# Patient Record
Sex: FEMALE | Race: WHITE | NOT HISPANIC OR LATINO | Employment: OTHER | RURAL
[De-identification: names, ages, dates, MRNs, and addresses within clinical notes are randomized per-mention and may not be internally consistent; named-entity substitution may affect disease eponyms.]

---

## 2019-06-28 ENCOUNTER — OFFICE VISIT (OUTPATIENT)
Dept: FAMILY MEDICINE CLINIC | Facility: CLINIC | Age: 69
End: 2019-06-28

## 2019-06-28 VITALS
TEMPERATURE: 97.2 F | SYSTOLIC BLOOD PRESSURE: 124 MMHG | OXYGEN SATURATION: 91 % | WEIGHT: 134.8 LBS | HEART RATE: 58 BPM | BODY MASS INDEX: 23.01 KG/M2 | DIASTOLIC BLOOD PRESSURE: 76 MMHG | HEIGHT: 64 IN

## 2019-06-28 DIAGNOSIS — E78.00 HYPERCHOLESTEROLEMIA: Primary | ICD-10-CM

## 2019-06-28 DIAGNOSIS — M19.90 ARTHRITIS: ICD-10-CM

## 2019-06-28 DIAGNOSIS — F39 MOOD DISORDER (HCC): ICD-10-CM

## 2019-06-28 DIAGNOSIS — I10 ESSENTIAL HYPERTENSION: ICD-10-CM

## 2019-06-28 DIAGNOSIS — R63.4 WEIGHT LOSS: ICD-10-CM

## 2019-06-28 DIAGNOSIS — D64.9 ANEMIA, UNSPECIFIED TYPE: ICD-10-CM

## 2019-06-28 PROBLEM — G47.00 INSOMNIA: Status: ACTIVE | Noted: 2019-06-28

## 2019-06-28 PROBLEM — F41.9 ANXIETY: Status: ACTIVE | Noted: 2019-06-28

## 2019-06-28 PROBLEM — R53.83 FATIGUE: Status: ACTIVE | Noted: 2019-06-28

## 2019-06-28 PROBLEM — E53.8 COBALAMIN DEFICIENCY: Status: ACTIVE | Noted: 2019-06-28

## 2019-06-28 PROBLEM — Z72.0 TOBACCO USER: Status: ACTIVE | Noted: 2019-06-28

## 2019-06-28 PROBLEM — R43.2 TASTE SENSE ALTERED: Status: ACTIVE | Noted: 2019-06-28

## 2019-06-28 PROCEDURE — 99203 OFFICE O/P NEW LOW 30 MIN: CPT | Performed by: FAMILY MEDICINE

## 2019-06-28 RX ORDER — LAMOTRIGINE 150 MG/1
150 TABLET ORAL DAILY
Qty: 90 TABLET | Refills: 3 | Status: SHIPPED | OUTPATIENT
Start: 2019-06-28 | End: 2019-10-02 | Stop reason: SDUPTHER

## 2019-06-28 RX ORDER — QUETIAPINE FUMARATE 50 MG/1
1 TABLET, FILM COATED ORAL
COMMUNITY
End: 2019-06-28 | Stop reason: SDUPTHER

## 2019-06-28 RX ORDER — PANTOPRAZOLE SODIUM 40 MG/1
1 TABLET, DELAYED RELEASE ORAL EVERY MORNING
COMMUNITY
End: 2020-10-16 | Stop reason: SDUPTHER

## 2019-06-28 RX ORDER — ATORVASTATIN CALCIUM 10 MG/1
10 TABLET, FILM COATED ORAL DAILY
Qty: 90 TABLET | Refills: 3 | Status: SHIPPED | OUTPATIENT
Start: 2019-06-28 | End: 2020-10-16 | Stop reason: SDUPTHER

## 2019-06-28 RX ORDER — QUETIAPINE FUMARATE 50 MG/1
50 TABLET, FILM COATED ORAL
Qty: 90 TABLET | Refills: 3 | Status: SHIPPED | OUTPATIENT
Start: 2019-06-28 | End: 2019-10-02 | Stop reason: SDUPTHER

## 2019-06-28 RX ORDER — MELOXICAM 7.5 MG/1
7.5 TABLET ORAL DAILY
Qty: 90 TABLET | Refills: 3 | Status: SHIPPED | OUTPATIENT
Start: 2019-06-28 | End: 2019-10-02 | Stop reason: SDUPTHER

## 2019-06-28 RX ORDER — LISINOPRIL 10 MG/1
1 TABLET ORAL DAILY
COMMUNITY
End: 2019-06-28

## 2019-06-28 RX ORDER — SERTRALINE HYDROCHLORIDE 100 MG/1
100 TABLET, FILM COATED ORAL
Qty: 90 TABLET | Refills: 3 | Status: SHIPPED | OUTPATIENT
Start: 2019-06-28 | End: 2019-10-02 | Stop reason: SDUPTHER

## 2019-06-28 RX ORDER — MELOXICAM 7.5 MG/1
1 TABLET ORAL DAILY
COMMUNITY
End: 2019-06-28 | Stop reason: SDUPTHER

## 2019-06-28 RX ORDER — SERTRALINE HYDROCHLORIDE 100 MG/1
1 TABLET, FILM COATED ORAL
COMMUNITY
End: 2019-06-28 | Stop reason: SDUPTHER

## 2019-06-28 RX ORDER — ATORVASTATIN CALCIUM 10 MG/1
1 TABLET, FILM COATED ORAL DAILY
COMMUNITY
End: 2019-06-28 | Stop reason: SDUPTHER

## 2019-06-28 RX ORDER — LAMOTRIGINE 150 MG/1
1 TABLET ORAL DAILY
COMMUNITY
End: 2019-06-28 | Stop reason: SDUPTHER

## 2019-06-28 NOTE — PROGRESS NOTES
OFFICE VISIT NOTE:    Isela Mejía is a 68 y.o. female who presents today for Hypertension and Establish Care.     No meds for a couple of weeks, and her BP is good today.       Hypertension   This is a chronic problem. The current episode started more than 1 year ago. The problem is unchanged. The problem is controlled. Pertinent negatives include no chest pain, headaches, orthopnea, palpitations, peripheral edema, PND or shortness of breath. There are no associated agents to hypertension. Risk factors for coronary artery disease include family history, post-menopausal state, stress, sedentary lifestyle and smoking/tobacco exposure. Past treatments include ACE inhibitors. Current antihypertension treatment includes ACE inhibitors. The current treatment provides significant improvement. There are no compliance problems.         Past medical/surgical history, Family history, Social history, Allergies and Medications have been reviewed with the patient today and are updated in [facility] EMR. See below.    Past Medical History:   Diagnosis Date   • Anemia    • Anxiety    • Arthritis    • Insomnia      Past Surgical History:   Procedure Laterality Date   • COLONOSCOPY     • REPLACEMENT TOTAL KNEE Right    • TUBAL ABDOMINAL LIGATION       Family History   Problem Relation Age of Onset   • Cancer Mother    • Cancer Father      Social History     Tobacco Use   • Smoking status: Current Every Day Smoker     Packs/day: 0.50     Years: 30.00     Pack years: 15.00     Types: Cigarettes   • Smokeless tobacco: Never Used   Substance Use Topics   • Alcohol use: No     Frequency: Never     Binge frequency: Never   • Drug use: No       Allergies:  Patient has no known allergies.    Current Meds:    Current Outpatient Medications:   •  atorvastatin (LIPITOR) 10 MG tablet, Take 1 tablet by mouth Daily., Disp: 90 tablet, Rfl: 3  •  lamoTRIgine (LAMICTAL) 150 MG tablet, Take 1 tablet by mouth Daily., Disp: 90 tablet, Rfl: 3  •  meloxicam  "(MOBIC) 7.5 MG tablet, Take 1 tablet by mouth Daily., Disp: 90 tablet, Rfl: 3  •  pantoprazole (PROTONIX) 40 MG EC tablet, Take 1 tablet by mouth Every Morning., Disp: , Rfl:   •  QUEtiapine (SEROQUEL) 50 MG tablet, Take 1 tablet by mouth every night at bedtime., Disp: 90 tablet, Rfl: 3  •  sertraline (ZOLOFT) 100 MG tablet, Take 1 tablet by mouth every night at bedtime., Disp: 90 tablet, Rfl: 3    Review of Systems:  Review of Systems   Constitutional: Negative for activity change, fatigue, fever, unexpected weight gain and unexpected weight loss.   Respiratory: Negative for shortness of breath.    Cardiovascular: Negative for chest pain, palpitations, orthopnea and PND.   Gastrointestinal: Negative for abdominal pain.   Genitourinary: Negative for difficulty urinating.   Skin: Negative for rash.   Neurological: Negative for syncope and headache.       Physical Examination:  Vital Signs:  /76 (BP Location: Left arm, Patient Position: Sitting, Cuff Size: Adult)   Pulse 58   Temp 97.2 °F (36.2 °C) (Tympanic)   Ht 162.6 cm (64\")   Wt 61.1 kg (134 lb 12.8 oz)   LMP  (LMP Unknown)   SpO2 91%   Breastfeeding? No   BMI 23.14 kg/m²   Physical Exam   Constitutional: She is oriented to person, place, and time. She appears well-developed and well-nourished. No distress.   HENT:   Head: Normocephalic and atraumatic.   Mouth/Throat: Oropharynx is clear and moist.   Neck: Normal range of motion. Neck supple. No JVD present.   Cardiovascular: Normal rate, regular rhythm, normal heart sounds and intact distal pulses.   Pulmonary/Chest: Effort normal and breath sounds normal. No respiratory distress.   Musculoskeletal: Normal range of motion. She exhibits edema (BLE CVI mild).   Neurological: She is alert and oriented to person, place, and time. No cranial nerve deficit.   Skin: Skin is warm and dry. Capillary refill takes less than 2 seconds. No rash noted.   Psychiatric: She has a normal mood and affect. Her " behavior is normal.   Nursing note and vitals reviewed.      Procedures    ASSESSMENT/ PLAN:        Problem List Items Addressed This Visit        Cardiovascular and Mediastinum    Hypercholesterolemia - Primary    Relevant Medications    atorvastatin (LIPITOR) 10 MG tablet    Other Relevant Orders    Comprehensive metabolic panel    Lipid panel    Hypertensive disorder    Relevant Orders    Comprehensive metabolic panel    Lipid panel    CBC and Differential    T4    TSH       Musculoskeletal and Integument    Arthritis    Relevant Medications    meloxicam (MOBIC) 7.5 MG tablet       Hematopoietic and Hemostatic    Anemia       Other    Mood disorder (CMS/HCC)    Relevant Medications    lamoTRIgine (LAMICTAL) 150 MG tablet    QUEtiapine (SEROQUEL) 50 MG tablet    sertraline (ZOLOFT) 100 MG tablet      Other Visit Diagnoses     Weight loss        Relevant Orders    T4    TSH                   Specific Patient Instructions:  MEDICATION Instructions: Encouraged patient to continue routine medicines as prescribed and maintain compliance. Patient instructed to report any adverse side effects or reactions to medicines promptly to the office. Patient instructed to make us aware of any OTC or herbal meds or supplement use.  DIET Recommendations: No new recommendations regarding diet/restrictions.  EXERCISE Instructions: No new recommendations.    SMOKING Recommendations: N/A  HEALTH MAINTENANCE:  N/A  MISCELLANEOUS Instructions: N/A      Medications ordered or changed this visit:  New Medications Ordered This Visit   Medications   • atorvastatin (LIPITOR) 10 MG tablet     Sig: Take 1 tablet by mouth Daily.     Dispense:  90 tablet     Refill:  3   • lamoTRIgine (LAMICTAL) 150 MG tablet     Sig: Take 1 tablet by mouth Daily.     Dispense:  90 tablet     Refill:  3   • meloxicam (MOBIC) 7.5 MG tablet     Sig: Take 1 tablet by mouth Daily.     Dispense:  90 tablet     Refill:  3   • QUEtiapine (SEROQUEL) 50 MG tablet     Sig:  Take 1 tablet by mouth every night at bedtime.     Dispense:  90 tablet     Refill:  3   • sertraline (ZOLOFT) 100 MG tablet     Sig: Take 1 tablet by mouth every night at bedtime.     Dispense:  90 tablet     Refill:  3        FOLLOW-UP:  Return in about 3 months (around 9/28/2019) for Recheck.    I discussed the patients findings and my recommendations with patient.  An After Visit Summary (AVS) was printed and given to the patient at discharge.      Henok Torres MD, FAAFP  6/30/2019

## 2019-06-28 NOTE — PATIENT INSTRUCTIONS
Suspect Essential HTN.Good BP control is encouraged with Goal BP based on JNC 8 guidelines 2014 <140/90 for patients with known cardiac disease and diabetes. (SOL. 2014:322 (5):507-520. doi:10.1001/sol.2013.39763): general population <60 yr old goal BP <140/90 and for those >60 <150/90.  For patients of all ages with Diabetes, CKD, Known CAD <140/90. Recommended to the patient to obtain electronic home BP machine with upper arm blood pressure cuff and to check regularly as instructed.  Keep BP log and bring to subsequent visits. Stable, at goal.  a. LABS: routine for hypertension recommended and ordered if necessary.  b. Recommend if you do not have a home BP machine to obtain an electronic machine with arm blood pressure cuff.      c. Monitor BP over the next week and keep log to bring back to office. Discussed medication therapy however pt wants to try to control with diet exercise. .  Your provider  has recommended self-monitoring of your blood pressure.  If you do not have a blood pressure cuff you may purchase one from the local pharmacy.  You may ask the pharmacist which brand and model they recommend.  Obtain your blood pressure measurement at least 2x per week.  You should also check your blood pressure if you experience any symptoms of blurred visit, dizziness or headache.  Please record all blood pressure measurements and bring them to next office visit.  If you have any questions about the accuracy of your blood pressure machine please bring it in to the office and our staff will be happy to check accuracy.   d. Encouraged to eat a low sodium heart healthy diet  e. Offered handout on HTN educational topics.  These were provided if patient requested these today.  f. MEDS: as listed in today's visit.  g. Risks/benefits of current and new medications discussed with the patient and or family today.  The patient/family are aware and accept that if there any side effects they should call or return to clinic  as soon as possible.  Appropriate F/U discussed for topics addressed today. All questions were answered to the  satisfactory state of patient/family.  Should symptoms fail to improve or worsen they agree to call or return to clinic or to go to the ER. Education handouts were offered on any new Rx if requested.  Discussed the importance of following up with any needed screening tests/labs/specialist appointments and any requested follow-up recommended by me today.  Importance of maintaining follow-up discussed and patient accepts that missed appointments can delay diagnosis and potentially lead to worsening of conditions.      Cholesterol  Cholesterol is a white, waxy, fat-like substance that is needed by the human body in small amounts. The liver makes all the cholesterol we need. Cholesterol is carried from the liver by the blood through the blood vessels. Deposits of cholesterol (plaques) may build up on blood vessel (artery) walls. Plaques make the arteries narrower and stiffer. Cholesterol plaques increase the risk for heart attack and stroke.  You cannot feel your cholesterol level even if it is very high. The only way to know that it is high is to have a blood test. Once you know your cholesterol levels, you should keep a record of the test results. Work with your health care provider to keep your levels in the desired range.  What do the results mean?  · Total cholesterol is a rough measure of all the cholesterol in your blood.  · LDL (low-density lipoprotein) is the “bad” cholesterol. This is the type that causes plaque to build up on the artery walls. You want this level to be low.  · HDL (high-density lipoprotein) is the “good” cholesterol because it cleans the arteries and carries the LDL away. You want this level to be high.  · Triglycerides are fat that the body can either burn for energy or store. High levels are closely linked to heart disease.  What are the desired levels of cholesterol?  · Total  cholesterol below 200.  · LDL below 100 for people who are at risk, below 70 for people at very high risk.  · HDL above 40 is good. A level of 60 or higher is considered to be protective against heart disease.  · Triglycerides below 150.  How can I lower my cholesterol?  Diet  Follow your diet program as told by your health care provider.  · Choose fish or white meat chicken and turkey, roasted or baked. Limit fatty cuts of red meat, fried foods, and processed meats, such as sausage and lunch meats.  · Eat lots of fresh fruits and vegetables.  · Choose whole grains, beans, pasta, potatoes, and cereals.  · Choose olive oil, corn oil, or canola oil, and use only small amounts.  · Avoid butter, mayonnaise, shortening, or palm kernel oils.  · Avoid foods with trans fats.  · Drink skim or nonfat milk and eat low-fat or nonfat yogurt and cheeses. Avoid whole milk, cream, ice cream, egg yolks, and full-fat cheeses.  · Healthier desserts include ramona food cake, debbi snaps, animal crackers, hard candy, popsicles, and low-fat or nonfat frozen yogurt. Avoid pastries, cakes, pies, and cookies.    Exercise  · Follow your exercise program as told by your health care provider. A regular program:  ? Helps to decrease LDL and raise HDL.  ? Helps with weight control.  · Do things that increase your activity level, such as gardening, walking, and taking the stairs.  · Ask your health care provider about ways that you can be more active in your daily life.    Medicine  · Take over-the-counter and prescription medicines only as told by your health care provider.  ? Medicine may be prescribed by your health care provider to help lower cholesterol and decrease the risk for heart disease. This is usually done if diet and exercise have failed to bring down cholesterol levels.  ? If you have several risk factors, you may need medicine even if your levels are normal.    This information is not intended to replace advice given to you by  your health care provider. Make sure you discuss any questions you have with your health care provider.  Document Released: 09/12/2002 Document Revised: 07/15/2017 Document Reviewed: 06/17/2017  [employer] Interactive Patient Education © 2019 [employer]      Living With Depression  Everyone experiences occasional disappointment, sadness, and loss in their lives. When you are feeling down, blue, or sad for at least 2 weeks in a row, it may mean that you have depression. Depression can affect your thoughts and feelings, relationships, daily activities, and physical health. It is caused by changes in the way your brain functions. If you receive a diagnosis of depression, your health care provider will tell you which type of depression you have and what treatment options are available to you.  If you are living with depression, there are ways to help you recover from it and also ways to prevent it from coming back.  How to cope with lifestyle changes  Coping with stress  Stress is your body’s reaction to life changes and events, both good and bad. Stressful situations may include:  · Getting .  · The death of a spouse.  · Losing a job.  · Retiring.  · Having a baby.    Stress can last just a few hours or it can be ongoing. Stress can play a major role in depression, so it is important to learn both how to cope with stress and how to think about it differently.  Talk with your health care provider or a counselor if you would like to learn more about stress reduction. He or she may suggest some stress reduction techniques, such as:  · Music therapy. This can include creating music or listening to music. Choose music that you enjoy and that inspires you.  · Mindfulness-based meditation. This kind of meditation can be done while sitting or walking. It involves being aware of your normal breaths, rather than trying to control your breathing.  · Centering prayer. This is a kind of meditation that involves focusing on  a spiritual word or phrase. Choose a word, phrase, or sacred image that is meaningful to you and that brings you peace.  · Deep breathing. To do this, expand your stomach and inhale slowly through your nose. Hold your breath for 3-5 seconds, then exhale slowly, allowing your stomach muscles to relax.  · Muscle relaxation. This involves intentionally tensing muscles then relaxing them.    Choose a stress reduction technique that fits your lifestyle and personality. Stress reduction techniques take time and practice to develop. Set aside 5-15 minutes a day to do them. Therapists can offer training in these techniques. The training may be covered by some insurance plans. Other things you can do to manage stress include:  · Keeping a stress diary. This can help you learn what triggers your stress and ways to control your response.  · Understanding what your limits are and saying no to requests or events that lead to a schedule that is too full.  · Thinking about how you respond to certain situations. You may not be able to control everything, but you can control how you react.  · Adding humor to your life by watching funny films or TV shows.  · Making time for activities that help you relax and not feeling guilty about spending your time this way.    Medicines  Your health care provider may suggest certain medicines if he or she feels that they will help improve your condition. Avoid using alcohol and other substances that may prevent your medicines from working properly (may interact). It is also important to:  · Talk with your pharmacist or health care provider about all the medicines that you take, their possible side effects, and what medicines are safe to take together.  · Make it your goal to take part in all treatment decisions (shared decision-making). This includes giving input on the side effects of medicines. It is best if shared decision-making with your health care provider is part of your total treatment  plan.    If your health care provider prescribes a medicine, you may not notice the full benefits of it for 4-8 weeks. Most people who are treated for depression need to be on medicine for at least 6-12 months after they feel better. If you are taking medicines as part of your treatment, do not stop taking medicines without first talking to your health care provider. You may need to have the medicine slowly decreased (tapered) over time to decrease the risk of harmful side effects.  Relationships  Your health care provider may suggest family therapy along with individual therapy and drug therapy. While there may not be family problems that are causing you to feel depressed, it is still important to make sure your family learns as much as they can about your mental health. Having your family’s support can help make your treatment successful.  How to recognize changes in your condition  Everyone has a different response to treatment for depression. Recovery from major depression happens when you have not had signs of major depression for two months. This may mean that you will start to:  · Have more interest in doing activities.  · Feel less hopeless than you did 2 months ago.  · Have more energy.  · Overeat less often, or have better or improving appetite.  · Have better concentration.    Your health care provider will work with you to decide the next steps in your recovery. It is also important to recognize when your condition is getting worse. Watch for these signs:  · Having fatigue or low energy.  · Eating too much or too little.  · Sleeping too much or too little.  · Feeling restless, agitated, or hopeless.  · Having trouble concentrating or making decisions.  · Having unexplained physical complaints.  · Feeling irritable, angry, or aggressive.    Get help as soon as you or your family members notice these symptoms coming back.  How to get support and help from others  How to talk with friends and family members  about your condition  Talking to friends and family members about your condition can provide you with one way to get support and guidance. Reach out to trusted friends or family members, explain your symptoms to them, and let them know that you are working with a health care provider to treat your depression.  Financial resources  Not all insurance plans cover mental health care, so it is important to check with your insurance carrier. If paying for co-pays or counseling services is a problem, search for a [facility] or [facility] mental health care center. They may be able to offer public mental health care services at low or no cost when you are not able to see a private health care provider.  If you are taking medicine for depression, you may be able to get the generic form, which may be less expensive. Some makers of prescription medicines also offer help to patients who cannot afford the medicines they need.  Follow these instructions at home:  · Get the right amount and quality of sleep.  · Cut down on using caffeine, tobacco, alcohol, and other potentially harmful substances.  · Try to exercise, such as walking or lifting small weights.  · Take over-the-counter and prescription medicines only as told by your health care provider.  · Eat a healthy diet that includes plenty of vegetables, fruits, whole grains, low-fat dairy products, and lean protein. Do not eat a lot of foods that are high in solid fats, added sugars, or salt.  · Keep all follow-up visits as told by your health care provider. This is important.  Contact a health care provider if:  · You stop taking your antidepressant medicines, and you have any of these symptoms:  ? Nausea.  ? Headache.  ? Feeling lightheaded.  ? Chills and body aches.  ? Not being able to sleep (insomnia).  · You or your friends and family think your depression is getting worse.  Get help right away if:  · You have thoughts of hurting yourself or others.  If you ever feel like you  may hurt yourself or others, or have thoughts about taking your own life, get help right away. You can go to your nearest emergency department or call:  · Your local emergency services (911 in the [place]  · A suicide crisis helpline, such as the National Suicide Prevention Lifeline at 1-173.361.6659. This is open 24-hours a day.    Summary  · If you are living with depression, there are ways to help you recover from it and also ways to prevent it from coming back.  · Work with your health care team to create a management plan that includes counseling, stress management techniques, and healthy lifestyle habits.  This information is not intended to replace advice given to you by your health care provider. Make sure you discuss any questions you have with your health care provider.  Document Released: 11/20/2017 Document Revised: 11/20/2017 Document Reviewed: 11/20/2017  [employer] Interactive Patient Education © 2019 [employer]

## 2019-10-02 ENCOUNTER — OFFICE VISIT (OUTPATIENT)
Dept: FAMILY MEDICINE CLINIC | Facility: CLINIC | Age: 69
End: 2019-10-02

## 2019-10-02 VITALS
WEIGHT: 132.5 LBS | HEART RATE: 85 BPM | OXYGEN SATURATION: 94 % | SYSTOLIC BLOOD PRESSURE: 132 MMHG | HEIGHT: 64 IN | DIASTOLIC BLOOD PRESSURE: 76 MMHG | BODY MASS INDEX: 22.62 KG/M2

## 2019-10-02 DIAGNOSIS — F39 MOOD DISORDER (HCC): ICD-10-CM

## 2019-10-02 DIAGNOSIS — Z23 NEED FOR IMMUNIZATION AGAINST INFLUENZA: Primary | ICD-10-CM

## 2019-10-02 DIAGNOSIS — M19.90 ARTHRITIS: ICD-10-CM

## 2019-10-02 PROCEDURE — 90653 IIV ADJUVANT VACCINE IM: CPT | Performed by: FAMILY MEDICINE

## 2019-10-02 PROCEDURE — G0008 ADMIN INFLUENZA VIRUS VAC: HCPCS | Performed by: FAMILY MEDICINE

## 2019-10-02 PROCEDURE — 99214 OFFICE O/P EST MOD 30 MIN: CPT | Performed by: FAMILY MEDICINE

## 2019-10-02 RX ORDER — QUETIAPINE FUMARATE 100 MG/1
100 TABLET, FILM COATED ORAL
Qty: 90 TABLET | Refills: 1 | Status: SHIPPED | OUTPATIENT
Start: 2019-10-02 | End: 2020-04-06

## 2019-10-02 RX ORDER — MELOXICAM 7.5 MG/1
7.5 TABLET ORAL 2 TIMES DAILY
Qty: 60 TABLET | Refills: 0 | Status: SHIPPED | OUTPATIENT
Start: 2019-10-02 | End: 2019-10-02 | Stop reason: SDUPTHER

## 2019-10-02 RX ORDER — MELOXICAM 7.5 MG/1
7.5 TABLET ORAL 2 TIMES DAILY
Qty: 180 TABLET | Refills: 1 | Status: SHIPPED | OUTPATIENT
Start: 2019-10-02 | End: 2019-10-10 | Stop reason: DRUGHIGH

## 2019-10-02 RX ORDER — SERTRALINE HYDROCHLORIDE 100 MG/1
100 TABLET, FILM COATED ORAL
Qty: 30 TABLET | Refills: 0 | Status: SHIPPED | OUTPATIENT
Start: 2019-10-02 | End: 2019-10-02 | Stop reason: SDUPTHER

## 2019-10-02 RX ORDER — LAMOTRIGINE 150 MG/1
150 TABLET ORAL DAILY
Qty: 30 TABLET | Refills: 0 | Status: SHIPPED | OUTPATIENT
Start: 2019-10-02 | End: 2019-10-02 | Stop reason: SDUPTHER

## 2019-10-02 RX ORDER — QUETIAPINE FUMARATE 100 MG/1
100 TABLET, FILM COATED ORAL
Qty: 30 TABLET | Refills: 0 | Status: SHIPPED | OUTPATIENT
Start: 2019-10-02 | End: 2019-10-02 | Stop reason: SDUPTHER

## 2019-10-02 RX ORDER — SERTRALINE HYDROCHLORIDE 100 MG/1
100 TABLET, FILM COATED ORAL
Qty: 90 TABLET | Refills: 1 | Status: SHIPPED | OUTPATIENT
Start: 2019-10-02 | End: 2020-06-01

## 2019-10-02 RX ORDER — LAMOTRIGINE 150 MG/1
150 TABLET ORAL DAILY
Qty: 90 TABLET | Refills: 1 | Status: SHIPPED | OUTPATIENT
Start: 2019-10-02 | End: 2020-06-01

## 2019-10-02 NOTE — PATIENT INSTRUCTIONS
Living With Depression  Everyone experiences occasional disappointment, sadness, and loss in their lives. When you are feeling down, blue, or sad for at least 2 weeks in a row, it may mean that you have depression. Depression can affect your thoughts and feelings, relationships, daily activities, and physical health. It is caused by changes in the way your brain functions. If you receive a diagnosis of depression, your health care provider will tell you which type of depression you have and what treatment options are available to you.  If you are living with depression, there are ways to help you recover from it and also ways to prevent it from coming back.  How to cope with lifestyle changes  Coping with stress  Stress is your body’s reaction to life changes and events, both good and bad. Stressful situations may include:  · Getting .  · The death of a spouse.  · Losing a job.  · Retiring.  · Having a baby.  Stress can last just a few hours or it can be ongoing. Stress can play a major role in depression, so it is important to learn both how to cope with stress and how to think about it differently.  Talk with your health care provider or a counselor if you would like to learn more about stress reduction. He or she may suggest some stress reduction techniques, such as:  · Music therapy. This can include creating music or listening to music. Choose music that you enjoy and that inspires you.  · Mindfulness-based meditation. This kind of meditation can be done while sitting or walking. It involves being aware of your normal breaths, rather than trying to control your breathing.  · Centering prayer. This is a kind of meditation that involves focusing on a spiritual word or phrase. Choose a word, phrase, or sacred image that is meaningful to you and that brings you peace.  · Deep breathing. To do this, expand your stomach and inhale slowly through your nose. Hold your breath for 3-5 seconds, then exhale slowly,  allowing your stomach muscles to relax.  · Muscle relaxation. This involves intentionally tensing muscles then relaxing them.  Choose a stress reduction technique that fits your lifestyle and personality. Stress reduction techniques take time and practice to develop. Set aside 5-15 minutes a day to do them. Therapists can offer training in these techniques. The training may be covered by some insurance plans. Other things you can do to manage stress include:  · Keeping a stress diary. This can help you learn what triggers your stress and ways to control your response.  · Understanding what your limits are and saying no to requests or events that lead to a schedule that is too full.  · Thinking about how you respond to certain situations. You may not be able to control everything, but you can control how you react.  · Adding humor to your life by watching funny films or TV shows.  · Making time for activities that help you relax and not feeling guilty about spending your time this way.    Medicines  Your health care provider may suggest certain medicines if he or she feels that they will help improve your condition. Avoid using alcohol and other substances that may prevent your medicines from working properly (may interact). It is also important to:  · Talk with your pharmacist or health care provider about all the medicines that you take, their possible side effects, and what medicines are safe to take together.  · Make it your goal to take part in all treatment decisions (shared decision-making). This includes giving input on the side effects of medicines. It is best if shared decision-making with your health care provider is part of your total treatment plan.  If your health care provider prescribes a medicine, you may not notice the full benefits of it for 4-8 weeks. Most people who are treated for depression need to be on medicine for at least 6-12 months after they feel better. If you are taking medicines as  part of your treatment, do not stop taking medicines without first talking to your health care provider. You may need to have the medicine slowly decreased (tapered) over time to decrease the risk of harmful side effects.  Relationships  Your health care provider may suggest family therapy along with individual therapy and drug therapy. While there may not be family problems that are causing you to feel depressed, it is still important to make sure your family learns as much as they can about your mental health. Having your family’s support can help make your treatment successful.  How to recognize changes in your condition  Everyone has a different response to treatment for depression. Recovery from major depression happens when you have not had signs of major depression for two months. This may mean that you will start to:  · Have more interest in doing activities.  · Feel less hopeless than you did 2 months ago.  · Have more energy.  · Overeat less often, or have better or improving appetite.  · Have better concentration.  Your health care provider will work with you to decide the next steps in your recovery. It is also important to recognize when your condition is getting worse. Watch for these signs:  · Having fatigue or low energy.  · Eating too much or too little.  · Sleeping too much or too little.  · Feeling restless, agitated, or hopeless.  · Having trouble concentrating or making decisions.  · Having unexplained physical complaints.  · Feeling irritable, angry, or aggressive.  Get help as soon as you or your family members notice these symptoms coming back.  How to get support and help from others  How to talk with friends and family members about your condition    Talking to friends and family members about your condition can provide you with one way to get support and guidance. Reach out to trusted friends or family members, explain your symptoms to them, and let them know that you are working with a  health care provider to treat your depression.  Financial resources  Not all insurance plans cover mental health care, so it is important to check with your insurance carrier. If paying for co-pays or counseling services is a problem, search for a local or [facility] mental health care center. They may be able to offer public mental health care services at low or no cost when you are not able to see a private health care provider.  If you are taking medicine for depression, you may be able to get the generic form, which may be less expensive. Some makers of prescription medicines also offer help to patients who cannot afford the medicines they need.  Follow these instructions at home:    · Get the right amount and quality of sleep.  · Cut down on using caffeine, tobacco, alcohol, and other potentially harmful substances.  · Try to exercise, such as walking or lifting small weights.  · Take over-the-counter and prescription medicines only as told by your health care provider.  · Eat a healthy diet that includes plenty of vegetables, fruits, whole grains, low-fat dairy products, and lean protein. Do not eat a lot of foods that are high in solid fats, added sugars, or salt.  · Keep all follow-up visits as told by your health care provider. This is important.  Contact a health care provider if:  · You stop taking your antidepressant medicines, and you have any of these symptoms:  ? Nausea.  ? Headache.  ? Feeling lightheaded.  ? Chills and body aches.  ? Not being able to sleep (insomnia).  · You or your friends and family think your depression is getting worse.  Get help right away if:  · You have thoughts of hurting yourself or others.  If you ever feel like you may hurt yourself or others, or have thoughts about taking your own life, get help right away. You can go to your nearest emergency department or call:  · Your local emergency services (911 in the [place]  · A suicide crisis helpline, such as the National Suicide  Prevention [facility] at 1-171.669.1745. This is open 24-hours a day.  Summary  · If you are living with depression, there are ways to help you recover from it and also ways to prevent it from coming back.  · Work with your health care team to create a management plan that includes counseling, stress management techniques, and healthy lifestyle habits.  This information is not intended to replace advice given to you by your health care provider. Make sure you discuss any questions you have with your health care provider.  Document Released: 11/20/2017 Document Revised: 11/20/2017 Document Reviewed: 11/20/2017  [employer] Interactive Patient Education © 2019 [employer]      Generalized Anxiety Disorder, Adult  Generalized anxiety disorder (MATT) is a mental health disorder. People with this condition constantly worry about everyday events. Unlike normal anxiety, worry related to MATT is not triggered by a specific event. These worries also do not fade or get better with time. MATT interferes with life functions, including relationships, work, and school.  MATT can vary from mild to severe. People with severe MATT can have intense waves of anxiety with physical symptoms (panic attacks).  What are the causes?  The exact cause of MATT is not known.  What increases the risk?  This condition is more likely to develop in:  · Women.  · People who have a family history of anxiety disorders.  · People who are very shy.  · People who experience very stressful life events, such as the death of a loved one.  · People who have a very stressful family environment.  What are the signs or symptoms?  People with MATT often worry excessively about many things in their lives, such as their health and family. They may also be overly concerned about:  · Doing well at work.  · Being on time.  · Natural disasters.  · Friendships.  Physical symptoms of MATT include:  · Fatigue.  · Muscle tension or having muscle twitches.  · Trembling or feeling  shaky.  · Being easily startled.  · Feeling like your heart is pounding or racing.  · Feeling out of breath or like you cannot take a deep breath.  · Having trouble falling asleep or staying asleep.  · Sweating.  · Nausea, diarrhea, or irritable bowel syndrome (IBS).  · Headaches.  · Trouble concentrating or remembering facts.  · Restlessness.  · Irritability.  How is this diagnosed?  Your health care provider can diagnose MATT based on your symptoms and medical history. You will also have a physical exam. The health care provider will ask specific questions about your symptoms, including how severe they are, when they started, and if they come and go. Your health care provider may ask you about your use of alcohol or drugs, including prescription medicines. Your health care provider may refer you to a mental health specialist for further evaluation.  Your health care provider will do a thorough examination and may perform additional tests to rule out other possible causes of your symptoms.  To be diagnosed with MATT, a person must have anxiety that:  · Is out of his or her control.  · Affects several different aspects of his or her life, such as work and relationships.  · Causes distress that makes him or her unable to take part in normal activities.  · Includes at least three physical symptoms of MATT, such as restlessness, fatigue, trouble concentrating, irritability, muscle tension, or sleep problems.  Before your health care provider can confirm a diagnosis of MATT, these symptoms must be present more days than they are not, and they must last for six months or longer.  How is this treated?  The following therapies are usually used to treat MATT:  · Medicine. Antidepressant medicine is usually prescribed for long-term daily control. Antianxiety medicines may be added in severe cases, especially when panic attacks occur.  · Talk therapy (psychotherapy). Certain types of talk therapy can be helpful in treating MATT by  providing support, education, and guidance. Options include:  ? Cognitive behavioral therapy (CBT). People learn coping skills and techniques to ease their anxiety. They learn to identify unrealistic or negative thoughts and behaviors and to replace them with positive ones.  ? Acceptance and commitment therapy (ACT). This treatment teaches people how to be mindful as a way to cope with unwanted thoughts and feelings.  ? Biofeedback. This process trains you to manage your body's response (physiological response) through breathing techniques and relaxation methods. You will work with a therapist while machines are used to monitor your physical symptoms.  · Stress management techniques. These include yoga, meditation, and exercise.  A mental health specialist can help determine which treatment is best for you. Some people see improvement with one type of therapy. However, other people require a combination of therapies.  Follow these instructions at home:  · Take over-the-counter and prescription medicines only as told by your health care provider.  · Try to maintain a normal routine.  · Try to anticipate stressful situations and allow extra time to manage them.  · Practice any stress management or self-calming techniques as taught by your health care provider.  · Do not punish yourself for setbacks or for not making progress.  · Try to recognize your accomplishments, even if they are small.  · Keep all follow-up visits as told by your health care provider. This is important.  Contact a health care provider if:  · Your symptoms do not get better.  · Your symptoms get worse.  · You have signs of depression, such as:  ? A persistently sad, cranky, or irritable mood.  ? Loss of enjoyment in activities that used to bring you terese.  ? Change in weight or eating.  ? Changes in sleeping habits.  ? Avoiding friends or family members.  ? Loss of energy for normal tasks.  ? Feelings of guilt or worthlessness.  Get help right away  if:  · You have serious thoughts about hurting yourself or others.  If you ever feel like you may hurt yourself or others, or have thoughts about taking your own life, get help right away. You can go to your nearest emergency department or call:  · Your local emergency services (911 in the [place]  · A suicide crisis helpline, such as the National Suicide Prevention [facility] at 1-109.464.3312. This is open 24 hours a day.  Summary  · Generalized anxiety disorder (MATT) is a mental health disorder that involves worry that is not triggered by a specific event.  · People with MATT often worry excessively about many things in their lives, such as their health and family.  · MATT may cause physical symptoms such as restlessness, trouble concentrating, sleep problems, frequent sweating, nausea, diarrhea, headaches, and trembling or muscle twitching.  · A mental health specialist can help determine which treatment is best for you. Some people see improvement with one type of therapy. However, other people require a combination of therapies.  This information is not intended to replace advice given to you by your health care provider. Make sure you discuss any questions you have with your health care provider.  Document Released: 04/14/2014 Document Revised: 11/07/2017 Document Reviewed: 11/07/2017  [employer] Interactive Patient Education © 2019 [employer]      Arthritis  Arthritis is a term that is commonly used to refer to joint pain or joint disease. There are more than 100 types of arthritis.  What are the causes?  The most common cause of this condition is wear and tear of a joint. Other causes include:  · Gout.  · Inflammation of a joint.  · An infection of a joint.  · Sprains and other injuries near the joint.  · A drug reaction or allergic reaction.  In some cases, the cause may not be known.  What are the signs or symptoms?  The main symptom of this condition is pain in the joint with movement. Other symptoms  include:  · Redness, swelling, or stiffness at a joint.  · Warmth coming from the joint.  · Fever.  · Overall feeling of illness.  How is this diagnosed?  This condition may be diagnosed with a physical exam and tests, including:  · Blood tests.  · Urine tests.  · Imaging tests, such as MRI, X-rays, or a CT scan.  Sometimes, fluid is removed from a joint for testing.  How is this treated?  Treatment for this condition may involve:  · Treatment of the cause, if it is known.  · Rest.  · Raising (elevating) the joint.  · Applying cold or hot packs to the joint.  · Medicines to improve symptoms and reduce inflammation.  · Injections of a steroid such as cortisone into the joint to help reduce pain and inflammation.  Depending on the cause of your arthritis, you may need to make lifestyle changes to reduce stress on your joint. These changes may include exercising more and losing weight.  Follow these instructions at home:  Medicines  · Take over-the-counter and prescription medicines only as told by your health care provider.  · Do not take aspirin to relieve pain if gout is suspected.  Activity  · Rest your joint if told by your health care provider. Rest is important when your disease is active and your joint feels painful, swollen, or stiff.  · Avoid activities that make the pain worse. It is important to balance activity with rest.  · Exercise your joint regularly with range-of-motion exercises as told by your health care provider. Try doing low-impact exercise, such as:  ? Swimming.  ? Water aerobics.  ? Biking.  ? Walking.  Joint Care    · If your joint is swollen, keep it elevated if told by your health care provider.  · If your joint feels stiff in the morning, try taking a warm shower.  · If directed, apply heat to the joint. If you have diabetes, do not apply heat without permission from your health care provider.  ? Put a towel between the joint and the hot pack or heating pad.  ? Leave the heat on the area  for 20-30 minutes.  · If directed, apply ice to the joint:  ? Put ice in a plastic bag.  ? Place a towel between your skin and the bag.  ? Leave the ice on for 20 minutes, 2-3 times per day.  · Keep all follow-up visits as told by your health care provider. This is important.  Contact a health care provider if:  · The pain gets worse.  · You have a fever.  Get help right away if:  · You develop severe joint pain, swelling, or redness.  · Many joints become painful and swollen.  · You develop severe back pain.  · You develop severe weakness in your leg.  · You cannot control your bladder or bowels.  This information is not intended to replace advice given to you by your health care provider. Make sure you discuss any questions you have with your health care provider.  Document Released: 01/25/2006 Document Revised: 05/25/2017 Document Reviewed: 03/14/2016  [employer] Interactive Patient Education © 2019 [employer]

## 2019-10-02 NOTE — PROGRESS NOTES
OFFICE VISIT NOTE:    Isela Mejía is a 69 y.o. female who presents today for Med Refill and Depression (recheck).     Also, needs refills for several meds for chronic stable conditions.  She relates she is out of meds, but mail-order pharmacy should have had more sent to her - out for about a week or so she says. Uncertain if she is taking them properly according to her history. Daughter agrees.   Also, taking Advil PM 2 or more each night to sleep without help, in addition to her meds.       Depression   Visit Type: follow-up (Daughter relates that she will never be 100% because spouse  about 4-5 months ago and she had been with him since 21 years old.)  Patient presents with the following symptoms: anhedonia, confusion, depressed mood, fatigue, feelings of hopelessness, feelings of worthlessness, insomnia, malaise, memory impairment, nervousness/anxiety and psychomotor retardation.  Patient is not experiencing: obsessions, palpitations, panic, restlessness, shortness of breath, suicidal ideas, suicidal planning, thoughts of death, weight gain and weight loss.  Frequency of symptoms: constantly   Severity: incapacitating   Sleep quality: fair  Nighttime awakenings: occasional  Compliance with medications:  %             Past medical/surgical history, Family history, Social history, Allergies and Medications have been reviewed with the patient today and are updated in [facility] EMR. See below.    Past Medical History:   Diagnosis Date   • Anemia    • Anxiety    • Arthritis    • Insomnia      Past Surgical History:   Procedure Laterality Date   • COLONOSCOPY     • REPLACEMENT TOTAL KNEE Right    • TUBAL ABDOMINAL LIGATION       Family History   Problem Relation Age of Onset   • Cancer Mother    • Cancer Father      Social History     Tobacco Use   • Smoking status: Current Every Day Smoker     Packs/day: 0.50     Years: 30.00     Pack years: 15.00     Types: Cigarettes   • Smokeless tobacco: Never Used  "  Substance Use Topics   • Alcohol use: No     Frequency: Never     Binge frequency: Never   • Drug use: No       Allergies:  Patient has no known allergies.    Current Meds:    Current Outpatient Medications:   •  atorvastatin (LIPITOR) 10 MG tablet, Take 1 tablet by mouth Daily., Disp: 90 tablet, Rfl: 3  •  lamoTRIgine (LAMICTAL) 150 MG tablet, Take 1 tablet by mouth Daily., Disp: 90 tablet, Rfl: 1  •  meloxicam (MOBIC) 7.5 MG tablet, Take 1 tablet by mouth 2 (Two) Times a Day., Disp: 180 tablet, Rfl: 1  •  pantoprazole (PROTONIX) 40 MG EC tablet, Take 1 tablet by mouth Every Morning., Disp: , Rfl:   •  QUEtiapine (SEROquel) 100 MG tablet, Take 1 tablet by mouth every night at bedtime., Disp: 90 tablet, Rfl: 1  •  sertraline (ZOLOFT) 100 MG tablet, Take 1 tablet by mouth every night at bedtime., Disp: 90 tablet, Rfl: 1    Review of Systems:  Review of Systems   Constitutional: Negative for activity change, fatigue, fever, unexpected weight gain and unexpected weight loss.   Respiratory: Negative for shortness of breath.    Cardiovascular: Negative for chest pain and palpitations.   Gastrointestinal: Negative for abdominal pain.   Genitourinary: Negative for difficulty urinating.   Skin: Negative for rash.   Neurological: Positive for confusion. Negative for syncope and headache.   Psychiatric/Behavioral: Positive for depressed mood. Negative for suicidal ideas. The patient is nervous/anxious and has insomnia.        Physical Examination:  Vital Signs:  /76 (BP Location: Left arm, Patient Position: Sitting, Cuff Size: Adult)   Pulse 85   Ht 162.6 cm (64\")   Wt 60.1 kg (132 lb 8 oz)   LMP  (LMP Unknown)   SpO2 94%   Breastfeeding? No   BMI 22.74 kg/m²   Physical Exam   Constitutional: She is oriented to person, place, and time. She appears well-developed and well-nourished. No distress.   HENT:   Head: Normocephalic and atraumatic.   Mouth/Throat: Oropharynx is clear and moist.   Neck: Normal range of " motion. Neck supple. No JVD present.   Cardiovascular: Normal rate, regular rhythm, normal heart sounds and intact distal pulses.   Pulmonary/Chest: Effort normal and breath sounds normal. No respiratory distress.   Musculoskeletal: Normal range of motion. She exhibits no edema.   Neurological: She is alert and oriented to person, place, and time. No cranial nerve deficit.   Skin: Skin is warm and dry. Capillary refill takes less than 2 seconds. No rash noted.   Psychiatric: She has a normal mood and affect. Her behavior is normal.   Nursing note and vitals reviewed.      Procedures    ASSESSMENT/ PLAN:        Problem List Items Addressed This Visit        Musculoskeletal and Integument    Arthritis    Relevant Medications    meloxicam (MOBIC) 7.5 MG tablet       Other    Mood disorder (CMS/HCC)    Relevant Medications    lamoTRIgine (LAMICTAL) 150 MG tablet    QUEtiapine (SEROquel) 100 MG tablet    sertraline (ZOLOFT) 100 MG tablet      Other Visit Diagnoses     Need for immunization against influenza    -  Primary    Relevant Orders    Fluad Tri 65yr ([id number]) (Completed)        Do not use Advil PM.            Specific Patient Instructions:  MEDICATION Instructions: Encouraged patient to continue routine medicines as prescribed and maintain compliance. Patient instructed to report any adverse side effects or reactions to medicines promptly to the office. Patient instructed to make us aware of any OTC or herbal meds or supplement use.  DIET Recommendations: No new recommendations regarding diet/restrictions.  EXERCISE Instructions: No new recommendations.    Patient's Body mass index is 22.74 kg/m². BMI is within normal parameters. No follow-up required..      SMOKING Recommendations: Counseled patient and encouraged them on smoking cessation.  HEALTH MAINTENANCE:  N/A  MISCELLANEOUS Instructions: N/A      Medications ordered or changed this visit:  New Medications Ordered This Visit   Medications   • lamoTRIgine  (LAMICTAL) 150 MG tablet     Sig: Take 1 tablet by mouth Daily.     Dispense:  90 tablet     Refill:  1   • meloxicam (MOBIC) 7.5 MG tablet     Sig: Take 1 tablet by mouth 2 (Two) Times a Day.     Dispense:  180 tablet     Refill:  1   • QUEtiapine (SEROquel) 100 MG tablet     Sig: Take 1 tablet by mouth every night at bedtime.     Dispense:  90 tablet     Refill:  1   • sertraline (ZOLOFT) 100 MG tablet     Sig: Take 1 tablet by mouth every night at bedtime.     Dispense:  90 tablet     Refill:  1        FOLLOW-UP:  Return in about 4 weeks (around 10/30/2019) for Recheck.    I discussed the patients findings and my recommendations with patient.  An After Visit Summary (AVS) was printed and given to the patient at discharge.      Henok Torres MD, FAAFP  10/2/2019

## 2019-10-09 ENCOUNTER — TELEPHONE (OUTPATIENT)
Dept: FAMILY MEDICINE CLINIC | Facility: CLINIC | Age: 69
End: 2019-10-09

## 2019-10-09 DIAGNOSIS — M19.90 ARTHRITIS: Primary | ICD-10-CM

## 2019-10-09 NOTE — TELEPHONE ENCOUNTER
[facility] called leaving a vm stating that Meloxicam 7.5 mg bid is not covered by pharmacy, once daily is approved or consider changing the strength.

## 2019-10-10 RX ORDER — MELOXICAM 15 MG/1
15 TABLET ORAL DAILY
Qty: 90 TABLET | Refills: 0 | Status: SHIPPED | OUTPATIENT
Start: 2019-10-10 | End: 2019-12-23

## 2019-12-21 DIAGNOSIS — M19.90 ARTHRITIS: ICD-10-CM

## 2019-12-23 RX ORDER — MELOXICAM 15 MG/1
TABLET ORAL
Qty: 90 TABLET | Refills: 1 | Status: SHIPPED | OUTPATIENT
Start: 2019-12-23 | End: 2020-06-22

## 2020-04-06 DIAGNOSIS — F39 MOOD DISORDER (HCC): ICD-10-CM

## 2020-04-06 RX ORDER — QUETIAPINE FUMARATE 100 MG/1
TABLET, FILM COATED ORAL
Qty: 90 TABLET | Refills: 1 | Status: SHIPPED | OUTPATIENT
Start: 2020-04-06 | End: 2020-10-16 | Stop reason: SDUPTHER

## 2020-05-30 DIAGNOSIS — F39 MOOD DISORDER (HCC): ICD-10-CM

## 2020-06-01 RX ORDER — LAMOTRIGINE 150 MG/1
TABLET ORAL
Qty: 30 TABLET | Refills: 0 | Status: SHIPPED | OUTPATIENT
Start: 2020-06-01 | End: 2020-10-16 | Stop reason: SDUPTHER

## 2020-06-01 RX ORDER — SERTRALINE HYDROCHLORIDE 100 MG/1
TABLET, FILM COATED ORAL
Qty: 30 TABLET | Refills: 0 | Status: SHIPPED | OUTPATIENT
Start: 2020-06-01 | End: 2020-10-16 | Stop reason: SDUPTHER

## 2020-06-01 NOTE — TELEPHONE ENCOUNTER
Patient is over due on Medicare Wellness and labs.  I called patient and left vm with this information and that she will be getting only a 30 day supply until these are completed.

## 2020-06-22 DIAGNOSIS — M19.90 ARTHRITIS: ICD-10-CM

## 2020-06-22 DIAGNOSIS — E78.00 HYPERCHOLESTEROLEMIA: ICD-10-CM

## 2020-06-22 RX ORDER — MELOXICAM 15 MG/1
TABLET ORAL
Qty: 30 TABLET | Refills: 0 | Status: SHIPPED | OUTPATIENT
Start: 2020-06-22 | End: 2020-10-16 | Stop reason: SDUPTHER

## 2020-06-22 RX ORDER — ATORVASTATIN CALCIUM 10 MG/1
TABLET, FILM COATED ORAL
Qty: 90 TABLET | Refills: 3 | OUTPATIENT
Start: 2020-06-22

## 2020-06-22 NOTE — TELEPHONE ENCOUNTER
Patient has not been seen in office since 10/02/19 and is overdue for annual wellness.  Please call to scheduled patient

## 2020-09-19 ENCOUNTER — HOSPITAL ENCOUNTER (OUTPATIENT)
Age: 70
Setting detail: OBSERVATION
Discharge: HOME | End: 2020-09-20
Attending: HOSPITALIST | Admitting: HOSPITALIST
Payer: MEDICARE

## 2020-09-19 ENCOUNTER — APPOINTMENT (OUTPATIENT)
Dept: CT IMAGING | Age: 70
End: 2020-09-19
Attending: HOSPITALIST
Payer: MEDICARE

## 2020-09-19 PROBLEM — F17.219 CIGARETTE NICOTINE DEPENDENCE WITH NICOTINE-INDUCED DISORDER: Status: ACTIVE | Noted: 2020-09-19

## 2020-09-19 PROBLEM — I10 ESSENTIAL HYPERTENSION: Status: ACTIVE | Noted: 2020-09-19

## 2020-09-19 PROBLEM — R79.89 ELEVATED D-DIMER: Status: ACTIVE | Noted: 2020-09-19

## 2020-09-19 PROBLEM — R06.09 DYSPNEA ON EXERTION: Status: ACTIVE | Noted: 2020-09-19

## 2020-09-19 PROBLEM — R07.89 CHEST TIGHTNESS: Status: ACTIVE | Noted: 2020-09-19

## 2020-09-19 LAB
LV EF: 58 %
LVEF MODALITY: NORMAL
PRO-BNP: 190 PG/ML (ref 0–900)
TROPONIN: <0.01 NG/ML (ref 0–0.03)
TROPONIN: <0.01 NG/ML (ref 0–0.03)

## 2020-09-19 PROCEDURE — 99205 OFFICE O/P NEW HI 60 MIN: CPT | Performed by: INTERNAL MEDICINE

## 2020-09-19 PROCEDURE — G0379 DIRECT REFER HOSPITAL OBSERV: HCPCS

## 2020-09-19 PROCEDURE — 93306 TTE W/DOPPLER COMPLETE: CPT

## 2020-09-19 PROCEDURE — G0378 HOSPITAL OBSERVATION PER HR: HCPCS

## 2020-09-19 PROCEDURE — 6370000000 HC RX 637 (ALT 250 FOR IP): Performed by: PHYSICIAN ASSISTANT

## 2020-09-19 PROCEDURE — 6360000002 HC RX W HCPCS: Performed by: HOSPITALIST

## 2020-09-19 PROCEDURE — 96374 THER/PROPH/DIAG INJ IV PUSH: CPT

## 2020-09-19 PROCEDURE — 6370000000 HC RX 637 (ALT 250 FOR IP): Performed by: HOSPITALIST

## 2020-09-19 PROCEDURE — 2580000003 HC RX 258: Performed by: HOSPITALIST

## 2020-09-19 PROCEDURE — 94640 AIRWAY INHALATION TREATMENT: CPT

## 2020-09-19 PROCEDURE — 71275 CT ANGIOGRAPHY CHEST: CPT

## 2020-09-19 PROCEDURE — 84484 ASSAY OF TROPONIN QUANT: CPT

## 2020-09-19 PROCEDURE — 83880 ASSAY OF NATRIURETIC PEPTIDE: CPT

## 2020-09-19 PROCEDURE — 36415 COLL VENOUS BLD VENIPUNCTURE: CPT

## 2020-09-19 PROCEDURE — 6360000004 HC RX CONTRAST MEDICATION: Performed by: HOSPITALIST

## 2020-09-19 RX ORDER — ONDANSETRON 2 MG/ML
4 INJECTION INTRAMUSCULAR; INTRAVENOUS EVERY 6 HOURS PRN
Status: DISCONTINUED | OUTPATIENT
Start: 2020-09-19 | End: 2020-09-20 | Stop reason: HOSPADM

## 2020-09-19 RX ORDER — ASPIRIN 81 MG/1
81 TABLET, CHEWABLE ORAL DAILY
Status: DISCONTINUED | OUTPATIENT
Start: 2020-09-20 | End: 2020-09-20 | Stop reason: HOSPADM

## 2020-09-19 RX ORDER — POLYETHYLENE GLYCOL 3350 17 G/17G
17 POWDER, FOR SOLUTION ORAL DAILY PRN
Status: DISCONTINUED | OUTPATIENT
Start: 2020-09-19 | End: 2020-09-20 | Stop reason: HOSPADM

## 2020-09-19 RX ORDER — PROMETHAZINE HYDROCHLORIDE 12.5 MG/1
12.5 TABLET ORAL EVERY 6 HOURS PRN
Status: DISCONTINUED | OUTPATIENT
Start: 2020-09-19 | End: 2020-09-20 | Stop reason: HOSPADM

## 2020-09-19 RX ORDER — FAMOTIDINE 20 MG/1
20 TABLET, FILM COATED ORAL 2 TIMES DAILY
Status: DISCONTINUED | OUTPATIENT
Start: 2020-09-19 | End: 2020-09-20 | Stop reason: DRUGHIGH

## 2020-09-19 RX ORDER — NICOTINE 21 MG/24HR
1 PATCH, TRANSDERMAL 24 HOURS TRANSDERMAL DAILY
Status: DISCONTINUED | OUTPATIENT
Start: 2020-09-19 | End: 2020-09-20 | Stop reason: HOSPADM

## 2020-09-19 RX ORDER — METHYLPREDNISOLONE SODIUM SUCCINATE 125 MG/2ML
60 INJECTION, POWDER, LYOPHILIZED, FOR SOLUTION INTRAMUSCULAR; INTRAVENOUS EVERY 12 HOURS
Status: DISCONTINUED | OUTPATIENT
Start: 2020-09-19 | End: 2020-09-20

## 2020-09-19 RX ORDER — ATORVASTATIN CALCIUM 10 MG/1
10 TABLET, FILM COATED ORAL DAILY
Status: DISCONTINUED | OUTPATIENT
Start: 2020-09-19 | End: 2020-09-19 | Stop reason: SDUPTHER

## 2020-09-19 RX ORDER — LISINOPRIL 10 MG/1
10 TABLET ORAL DAILY
Status: DISCONTINUED | OUTPATIENT
Start: 2020-09-19 | End: 2020-09-20 | Stop reason: HOSPADM

## 2020-09-19 RX ORDER — SODIUM CHLORIDE 0.9 % (FLUSH) 0.9 %
10 SYRINGE (ML) INJECTION PRN
Status: DISCONTINUED | OUTPATIENT
Start: 2020-09-19 | End: 2020-09-20 | Stop reason: HOSPADM

## 2020-09-19 RX ORDER — LANOLIN ALCOHOL/MO/W.PET/CERES
3 CREAM (GRAM) TOPICAL NIGHTLY PRN
Status: DISCONTINUED | OUTPATIENT
Start: 2020-09-19 | End: 2020-09-20 | Stop reason: HOSPADM

## 2020-09-19 RX ORDER — MAGNESIUM SULFATE 1 G/100ML
1 INJECTION INTRAVENOUS PRN
Status: DISCONTINUED | OUTPATIENT
Start: 2020-09-19 | End: 2020-09-20 | Stop reason: HOSPADM

## 2020-09-19 RX ORDER — POTASSIUM CHLORIDE 7.45 MG/ML
10 INJECTION INTRAVENOUS PRN
Status: DISCONTINUED | OUTPATIENT
Start: 2020-09-19 | End: 2020-09-20 | Stop reason: HOSPADM

## 2020-09-19 RX ORDER — ACETAMINOPHEN 325 MG/1
650 TABLET ORAL EVERY 6 HOURS PRN
Status: DISCONTINUED | OUTPATIENT
Start: 2020-09-19 | End: 2020-09-20 | Stop reason: HOSPADM

## 2020-09-19 RX ORDER — ATORVASTATIN CALCIUM 40 MG/1
40 TABLET, FILM COATED ORAL NIGHTLY
Status: DISCONTINUED | OUTPATIENT
Start: 2020-09-19 | End: 2020-09-20 | Stop reason: HOSPADM

## 2020-09-19 RX ORDER — IPRATROPIUM BROMIDE AND ALBUTEROL SULFATE 2.5; .5 MG/3ML; MG/3ML
1 SOLUTION RESPIRATORY (INHALATION)
Status: DISCONTINUED | OUTPATIENT
Start: 2020-09-19 | End: 2020-09-20 | Stop reason: HOSPADM

## 2020-09-19 RX ORDER — SODIUM CHLORIDE 0.9 % (FLUSH) 0.9 %
10 SYRINGE (ML) INJECTION EVERY 12 HOURS SCHEDULED
Status: DISCONTINUED | OUTPATIENT
Start: 2020-09-19 | End: 2020-09-20 | Stop reason: HOSPADM

## 2020-09-19 RX ORDER — NITROGLYCERIN 0.4 MG/1
0.4 TABLET SUBLINGUAL EVERY 5 MIN PRN
Status: DISCONTINUED | OUTPATIENT
Start: 2020-09-19 | End: 2020-09-20 | Stop reason: HOSPADM

## 2020-09-19 RX ORDER — QUETIAPINE FUMARATE 25 MG/1
50 TABLET, FILM COATED ORAL DAILY
Status: DISCONTINUED | OUTPATIENT
Start: 2020-09-19 | End: 2020-09-20 | Stop reason: HOSPADM

## 2020-09-19 RX ORDER — POTASSIUM CHLORIDE 20 MEQ/1
40 TABLET, EXTENDED RELEASE ORAL PRN
Status: DISCONTINUED | OUTPATIENT
Start: 2020-09-19 | End: 2020-09-20 | Stop reason: HOSPADM

## 2020-09-19 RX ORDER — ACETAMINOPHEN 650 MG/1
650 SUPPOSITORY RECTAL EVERY 6 HOURS PRN
Status: DISCONTINUED | OUTPATIENT
Start: 2020-09-19 | End: 2020-09-20 | Stop reason: HOSPADM

## 2020-09-19 RX ORDER — SERTRALINE HYDROCHLORIDE 100 MG/1
100 TABLET, FILM COATED ORAL DAILY
COMMUNITY

## 2020-09-19 RX ADMIN — Medication 3 MG: at 19:53

## 2020-09-19 RX ADMIN — LAMOTRIGINE 150 MG: 25 TABLET ORAL at 19:53

## 2020-09-19 RX ADMIN — SODIUM CHLORIDE, PRESERVATIVE FREE 10 ML: 5 INJECTION INTRAVENOUS at 19:52

## 2020-09-19 RX ADMIN — FAMOTIDINE 20 MG: 20 TABLET ORAL at 14:50

## 2020-09-19 RX ADMIN — METHYLPREDNISOLONE SODIUM SUCCINATE 60 MG: 125 INJECTION, POWDER, FOR SOLUTION INTRAMUSCULAR; INTRAVENOUS at 13:36

## 2020-09-19 RX ADMIN — FAMOTIDINE 20 MG: 20 TABLET ORAL at 19:53

## 2020-09-19 RX ADMIN — IPRATROPIUM BROMIDE AND ALBUTEROL SULFATE 1 AMPULE: .5; 3 SOLUTION RESPIRATORY (INHALATION) at 18:57

## 2020-09-19 RX ADMIN — IPRATROPIUM BROMIDE AND ALBUTEROL SULFATE 1 AMPULE: .5; 3 SOLUTION RESPIRATORY (INHALATION) at 14:51

## 2020-09-19 RX ADMIN — IOPAMIDOL 90 ML: 755 INJECTION, SOLUTION INTRAVENOUS at 11:30

## 2020-09-19 RX ADMIN — ATORVASTATIN CALCIUM 40 MG: 40 TABLET, FILM COATED ORAL at 19:53

## 2020-09-19 RX ADMIN — QUETIAPINE FUMARATE 50 MG: 25 TABLET ORAL at 19:54

## 2020-09-19 ASSESSMENT — ENCOUNTER SYMPTOMS
NAUSEA: 0
VOMITING: 0
EYES NEGATIVE: 1
RESPIRATORY NEGATIVE: 1
SHORTNESS OF BREATH: 0
DIARRHEA: 0
GASTROINTESTINAL NEGATIVE: 1

## 2020-09-19 NOTE — CONSULTS
Colon Polyps Neg Hx     Esophageal Cancer Neg Hx     Liver Disease Neg Hx     Liver Cancer Neg Hx     Stomach Cancer Neg Hx     Rectal Cancer Neg Hx        Past Social History:  Social History     Socioeconomic History    Marital status:      Spouse name: Not on file    Number of children: Not on file    Years of education: Not on file    Highest education level: Not on file   Occupational History    Not on file   Social Needs    Financial resource strain: Not on file    Food insecurity     Worry: Not on file     Inability: Not on file    Transportation needs     Medical: Not on file     Non-medical: Not on file   Tobacco Use    Smoking status: Current Every Day Smoker     Packs/day: 0.25   Substance and Sexual Activity    Alcohol use: No    Drug use: No    Sexual activity: Not on file   Lifestyle    Physical activity     Days per week: Not on file     Minutes per session: Not on file    Stress: Not on file   Relationships    Social connections     Talks on phone: Not on file     Gets together: Not on file     Attends Religion service: Not on file     Active member of club or organization: Not on file     Attends meetings of clubs or organizations: Not on file     Relationship status: Not on file    Intimate partner violence     Fear of current or ex partner: Not on file     Emotionally abused: Not on file     Physically abused: Not on file     Forced sexual activity: Not on file   Other Topics Concern    Not on file   Social History Narrative     passed away 2014    Patient reportedly has mild dementia lives at home with her daughter who is a staff member on this floor    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 Authorizing Provider   sertraline (ZOLOFT) 100 MG tablet Take 100 mg by mouth daily   Yes Historical Provider, MD   meloxicam (MOBIC) 15 MG tablet Take 15 mg by mouth nightly     Historical Provider, MD   lamoTRIgine (LAMICTAL) 150 MG tablet Take 150 mg by mouth daily    Historical Provider, MD   atorvastatin (LIPITOR) 10 MG tablet Take 10 mg by mouth daily    Historical Provider, MD   lisinopril (PRINIVIL;ZESTRIL) 10 MG tablet Take 10 mg by mouth daily    Historical Provider, MD   QUEtiapine (SEROQUEL) 50 MG tablet Take 50 mg by mouth daily    Historical Provider, MD       Current Meds:   lamoTRIgine  150 mg Oral Daily    lisinopril  10 mg Oral Daily    QUEtiapine  50 mg Oral Daily    sodium chloride flush  10 mL Intravenous 2 times per day    famotidine  20 mg Oral BID    atorvastatin  40 mg Oral Nightly    [START ON 9/20/2020] aspirin  81 mg Oral Daily    nicotine  1 patch Transdermal Daily    ipratropium-albuterol  1 ampule Inhalation Q4H WA    methylPREDNISolone  60 mg Intravenous Q12H       Current Infused Meds:      Physical Exam:  Vitals:    09/19/20 1035   BP: 126/75   Pulse: 78   Resp: 14   Temp: 97.7 °F (36.5 °C)   SpO2: 94%     No intake or output data in the 24 hours ending 09/19/20 1501  Estimated body mass index is 23.91 kg/m² as calculated from the following:    Height as of 5/24/16: 5' 3\" (1.6 m). Weight as of 5/24/16: 135 lb (61.2 kg). Physical Exam  Vitals signs reviewed. Constitutional:       General: She is not in acute distress. Appearance: Normal appearance. She is well-developed and normal weight. She is not ill-appearing, toxic-appearing or diaphoretic. HENT:      Head: Normocephalic and atraumatic. Nose: Nose normal.      Mouth/Throat:      Mouth: Mucous membranes are moist.      Pharynx: Oropharynx is clear. Eyes:      General: No scleral icterus. Extraocular Movements: Extraocular movements intact. Pupils: Pupils are equal, round, and reactive to light. Neck:      Musculoskeletal: Normal range of motion and neck supple. No neck rigidity or muscular tenderness. Vascular: No carotid bruit or JVD. Cardiovascular:      Rate and Rhythm: Normal rate and regular rhythm. Heart sounds: Normal heart sounds. No murmur. No friction rub. No gallop. Pulmonary:      Effort: Pulmonary effort is normal. No respiratory distress. Breath sounds: Normal breath sounds. No stridor. No wheezing, rhonchi or rales. Chest:      Chest wall: No tenderness. Abdominal:      General: Abdomen is flat. Bowel sounds are normal. There is no distension. Palpations: Abdomen is soft. There is no mass. Tenderness: There is no abdominal tenderness. There is no right CVA tenderness, left CVA tenderness, guarding or rebound. Hernia: No hernia is present. Musculoskeletal:         General: No swelling, tenderness, deformity or signs of injury. Right lower leg: No edema. Left lower leg: No edema. Lymphadenopathy:      Cervical: No cervical adenopathy. Skin:     General: Skin is warm and dry. Neurological:      General: No focal deficit present. Mental Status: She is alert and oriented to person, place, and time. Mental status is at baseline. Cranial Nerves: No cranial nerve deficit. Sensory: No sensory deficit. Motor: No weakness. Coordination: Coordination normal.   Psychiatric:         Mood and Affect: Mood normal.         Behavior: Behavior normal.         Thought Content: Thought content normal.         Judgment: Judgment normal.         Labs:  No results for input(s): WBC, HGB, PLT in the last 72 hours. No results for input(s): NA, K, CL, CO2, BUN, CREATININE, LABGLOM, MG, CALCIUM, PHOS in the last 72 hours.     CK, CKMB, Troponin: @LABRCNT (CKTOTAL:3, CKMB:3, TROPONINI:3)@    Last 3 BNP:  No results for input(s): BNP in the last 72 hours. IMAGING:  Cta Pulmonary W Contrast    Result Date: 9/19/2020  CTA PULMONARY W CONTRAST 9/19/2020 10:29 AM HISTORY: Shortness of breath, elevated d-dimer COMPARISON: None. DLP: 345 mGy cm TECHNIQUE: Helical tomographic images of the chest were obtained after the administration of intravenous contrast following angiogram protocol. Additionally, 3D MIP reconstructions in the coronal and sagittal planes were provided. Automated exposure control was also utilized to decrease patient radiation dose. FINDINGS:  Pulmonary arteries: There is adequate enhancement of the pulmonary arteries to evaluate for central and segmental pulmonary emboli. There are no filling defects within the main, lobar, segmental or visualized subsegmental pulmonary arteries. The pulmonary vessels are within normal limits for size. Aorta and great vessels: The aorta is not well opacified with contrast due to the phase of the exam. Minimal coronary artery calcifications are visualized. Neck base: The imaged portion of the base of the neck appears unremarkable. Lungs: Mild centrilobular emphysema. Minimal debris in the bilateral lower lobe segmental and subsegmental airways. No consolidation. No pleural effusion. Heart: Mildly enlarged. There is no pericardial effusion. Lymph nodes: No pathologically enlarged mediastinal, hilar, or axillary lymph nodes are present. Small hiatal hernia. Bones and soft tissues: There are multiple chronic left posterior rib fractures. No acute bony abnormality. Upper abdomen: Nonobstructing bilateral renal stones. Partially imaged fusiform aneurysm of the abdominal aorta, measuring just over 3 cm along the included portion. 1. No evidence of pulmonary embolus. 2. Minimal debris in the bilateral lower lobe segmental and subsegmental airways, either mucus or perhaps minimal aspiration. No consolidation. 3. Moderate cardiomegaly.  Signed by Dr Quan Zapata on 9/19/2020 12:36 PM      Assessment:  1. Vague complaints of dyspnea clamminess pushing sensation on her chest anxiety etiology not certain cannot exclude unstable angina  2. CTA pulmonary today no evidence of pulmonary embolism or other significant abnormalities cardiomegaly noted  3. Hypertension  4. Tobacco abuse 1/2 pack/day  5. Elevated d-dimer 0.69  6. Dyspnea on exertion      Recommendations:  1. Echocardiogram  2.  Recommend pharmacologic stress testing Lexiscan planned tomorrow morning

## 2020-09-19 NOTE — PROGRESS NOTES
4 Eyes Skin Assessment    Arthur Casey is being assessed upon: Admission    I agree that Bryanna Hart, along with Monica Noe (either 2 RN's or 1 LPN and 1 RN) have performed a thorough Head to Toe Skin Assessment on the patient. ALL assessment sites listed below have been assessed. Areas assessed by both nurses:     [x]   Head, Face, and Ears   [x]   Shoulders, Back, and Chest  [x]   Arms, Elbows, and Hands   [x]   Coccyx, Sacrum, and Ischium  [x]   Legs, Feet, and Heels    Does the Patient have Skin Breakdown?  No    Miquel Prevention initiated: Yes  Wound Care Orders initiated: No    [facility] nurse consulted for Pressure Injury (Stage 3,4, Unstageable, DTI, NWPT, and Complex wounds) and New or Established Ostomies: Yes        Primary Nurse eSignature: Deepa Ruiz RN on 9/19/2020 at 12:26 PM      Co-Signer eSignature: {Esignature:[id number]}

## 2020-09-20 ENCOUNTER — APPOINTMENT (OUTPATIENT)
Dept: NUCLEAR MEDICINE | Age: 70
End: 2020-09-20
Attending: HOSPITALIST
Payer: MEDICARE

## 2020-09-20 VITALS
HEART RATE: 79 BPM | HEIGHT: 63 IN | WEIGHT: 132 LBS | SYSTOLIC BLOOD PRESSURE: 95 MMHG | BODY MASS INDEX: 23.39 KG/M2 | DIASTOLIC BLOOD PRESSURE: 61 MMHG | TEMPERATURE: 98.2 F | RESPIRATION RATE: 14 BRPM | OXYGEN SATURATION: 94 %

## 2020-09-20 PROBLEM — R07.89 CHEST TIGHTNESS: Status: RESOLVED | Noted: 2020-09-19 | Resolved: 2020-09-20

## 2020-09-20 PROBLEM — Z71.6 TOBACCO ABUSE COUNSELING: Status: ACTIVE | Noted: 2020-09-20

## 2020-09-20 PROBLEM — J44.9 COPD (CHRONIC OBSTRUCTIVE PULMONARY DISEASE) (HCC): Status: ACTIVE | Noted: 2020-09-20

## 2020-09-20 PROBLEM — R06.09 DYSPNEA ON EXERTION: Status: RESOLVED | Noted: 2020-09-19 | Resolved: 2020-09-20

## 2020-09-20 LAB
ANION GAP SERPL CALCULATED.3IONS-SCNC: 13 MMOL/L (ref 7–19)
BASOPHILS ABSOLUTE: 0 K/UL (ref 0–0.2)
BASOPHILS RELATIVE PERCENT: 0.2 % (ref 0–1)
BUN BLDV-MCNC: 12 MG/DL (ref 8–23)
CALCIUM SERPL-MCNC: 9.1 MG/DL (ref 8.8–10.2)
CHLORIDE BLD-SCNC: 107 MMOL/L (ref 98–111)
CHOLESTEROL, TOTAL: 122 MG/DL (ref 160–199)
CO2: 22 MMOL/L (ref 22–29)
CREAT SERPL-MCNC: 0.9 MG/DL (ref 0.5–0.9)
EKG P AXIS: 30 DEGREES
EKG P-R INTERVAL: 156 MS
EKG Q-T INTERVAL: 382 MS
EKG QRS DURATION: 98 MS
EKG QTC CALCULATION (BAZETT): 418 MS
EKG T AXIS: 8 DEGREES
EOSINOPHILS ABSOLUTE: 0 K/UL (ref 0–0.6)
EOSINOPHILS RELATIVE PERCENT: 0 % (ref 0–5)
GFR AFRICAN AMERICAN: >59
GFR NON-AFRICAN AMERICAN: >60
GLUCOSE BLD-MCNC: 132 MG/DL (ref 74–109)
HCT VFR BLD CALC: 40.4 % (ref 37–47)
HDLC SERPL-MCNC: 59 MG/DL (ref 65–121)
HEMOGLOBIN: 13.3 G/DL (ref 12–16)
IMMATURE GRANULOCYTES #: 0 K/UL
LDL CHOLESTEROL CALCULATED: 58 MG/DL
LYMPHOCYTES ABSOLUTE: 0.9 K/UL (ref 1.1–4.5)
LYMPHOCYTES RELATIVE PERCENT: 14.6 % (ref 20–40)
MAGNESIUM: 2.1 MG/DL (ref 1.6–2.4)
MCH RBC QN AUTO: 30.7 PG (ref 27–31)
MCHC RBC AUTO-ENTMCNC: 32.9 G/DL (ref 33–37)
MCV RBC AUTO: 93.3 FL (ref 81–99)
MONOCYTES ABSOLUTE: 0.1 K/UL (ref 0–0.9)
MONOCYTES RELATIVE PERCENT: 1.3 % (ref 0–10)
NEUTROPHILS ABSOLUTE: 5 K/UL (ref 1.5–7.5)
NEUTROPHILS RELATIVE PERCENT: 83.4 % (ref 50–65)
PDW BLD-RTO: 13.2 % (ref 11.5–14.5)
PHOSPHORUS: 3.3 MG/DL (ref 2.5–4.5)
PLATELET # BLD: 254 K/UL (ref 130–400)
PMV BLD AUTO: 11.2 FL (ref 9.4–12.3)
POTASSIUM SERPL-SCNC: 3.7 MMOL/L (ref 3.5–5)
RBC # BLD: 4.33 M/UL (ref 4.2–5.4)
SODIUM BLD-SCNC: 142 MMOL/L (ref 136–145)
TRIGL SERPL-MCNC: 24 MG/DL (ref 0–149)
TROPONIN: <0.01 NG/ML (ref 0–0.03)
WBC # BLD: 6 K/UL (ref 4.8–10.8)

## 2020-09-20 PROCEDURE — 94640 AIRWAY INHALATION TREATMENT: CPT

## 2020-09-20 PROCEDURE — 36415 COLL VENOUS BLD VENIPUNCTURE: CPT

## 2020-09-20 PROCEDURE — 2580000003 HC RX 258: Performed by: HOSPITALIST

## 2020-09-20 PROCEDURE — 93017 CV STRESS TEST TRACING ONLY: CPT

## 2020-09-20 PROCEDURE — 85025 COMPLETE CBC W/AUTO DIFF WBC: CPT

## 2020-09-20 PROCEDURE — 84100 ASSAY OF PHOSPHORUS: CPT

## 2020-09-20 PROCEDURE — A9500 TC99M SESTAMIBI: HCPCS | Performed by: INTERNAL MEDICINE

## 2020-09-20 PROCEDURE — 83735 ASSAY OF MAGNESIUM: CPT

## 2020-09-20 PROCEDURE — 3430000000 HC RX DIAGNOSTIC RADIOPHARMACEUTICAL: Performed by: INTERNAL MEDICINE

## 2020-09-20 PROCEDURE — 80061 LIPID PANEL: CPT

## 2020-09-20 PROCEDURE — 84484 ASSAY OF TROPONIN QUANT: CPT

## 2020-09-20 PROCEDURE — 6360000002 HC RX W HCPCS: Performed by: INTERNAL MEDICINE

## 2020-09-20 PROCEDURE — 93005 ELECTROCARDIOGRAM TRACING: CPT | Performed by: HOSPITALIST

## 2020-09-20 PROCEDURE — G0378 HOSPITAL OBSERVATION PER HR: HCPCS

## 2020-09-20 PROCEDURE — 96376 TX/PRO/DX INJ SAME DRUG ADON: CPT

## 2020-09-20 PROCEDURE — 99213 OFFICE O/P EST LOW 20 MIN: CPT | Performed by: INTERNAL MEDICINE

## 2020-09-20 PROCEDURE — 6370000000 HC RX 637 (ALT 250 FOR IP): Performed by: HOSPITALIST

## 2020-09-20 PROCEDURE — 6360000002 HC RX W HCPCS: Performed by: HOSPITALIST

## 2020-09-20 PROCEDURE — 93010 ELECTROCARDIOGRAM REPORT: CPT | Performed by: INTERNAL MEDICINE

## 2020-09-20 PROCEDURE — 80048 BASIC METABOLIC PNL TOTAL CA: CPT

## 2020-09-20 RX ORDER — METHYLPREDNISOLONE SODIUM SUCCINATE 40 MG/ML
40 INJECTION, POWDER, LYOPHILIZED, FOR SOLUTION INTRAMUSCULAR; INTRAVENOUS EVERY 12 HOURS
Status: DISCONTINUED | OUTPATIENT
Start: 2020-09-20 | End: 2020-09-20 | Stop reason: HOSPADM

## 2020-09-20 RX ORDER — LISINOPRIL 5 MG/1
5 TABLET ORAL DAILY
Qty: 30 TABLET | Refills: 1 | Status: SHIPPED | OUTPATIENT
Start: 2020-09-20 | End: 2020-09-20 | Stop reason: HOSPADM

## 2020-09-20 RX ORDER — ASPIRIN 81 MG/1
81 TABLET, CHEWABLE ORAL DAILY
Qty: 30 TABLET | Refills: 3 | Status: SHIPPED | OUTPATIENT
Start: 2020-09-21

## 2020-09-20 RX ORDER — METHYLPREDNISOLONE 4 MG/1
TABLET ORAL
Qty: 1 KIT | Refills: 0 | Status: SHIPPED | OUTPATIENT
Start: 2020-09-20 | End: 2020-09-26

## 2020-09-20 RX ORDER — FAMOTIDINE 20 MG/1
20 TABLET, FILM COATED ORAL DAILY
Status: DISCONTINUED | OUTPATIENT
Start: 2020-09-21 | End: 2020-09-20 | Stop reason: HOSPADM

## 2020-09-20 RX ORDER — ALBUTEROL SULFATE 90 UG/1
2 AEROSOL, METERED RESPIRATORY (INHALATION) EVERY 6 HOURS PRN
Qty: 1 INHALER | Refills: 0 | Status: SHIPPED | OUTPATIENT
Start: 2020-09-20

## 2020-09-20 RX ADMIN — TETRAKIS(2-METHOXYISOBUTYLISOCYANIDE)COPPER(I) TETRAFLUOROBORATE 10 MILLICURIE: 1 INJECTION, POWDER, LYOPHILIZED, FOR SOLUTION INTRAVENOUS at 11:07

## 2020-09-20 RX ADMIN — REGADENOSON 0.4 MG: 0.08 INJECTION, SOLUTION INTRAVENOUS at 12:25

## 2020-09-20 RX ADMIN — FAMOTIDINE 20 MG: 20 TABLET ORAL at 09:16

## 2020-09-20 RX ADMIN — IPRATROPIUM BROMIDE AND ALBUTEROL SULFATE 1 AMPULE: .5; 3 SOLUTION RESPIRATORY (INHALATION) at 14:45

## 2020-09-20 RX ADMIN — IPRATROPIUM BROMIDE AND ALBUTEROL SULFATE 1 AMPULE: .5; 3 SOLUTION RESPIRATORY (INHALATION) at 07:06

## 2020-09-20 RX ADMIN — SODIUM CHLORIDE, PRESERVATIVE FREE 10 ML: 5 INJECTION INTRAVENOUS at 09:16

## 2020-09-20 RX ADMIN — TETRAKIS(2-METHOXYISOBUTYLISOCYANIDE)COPPER(I) TETRAFLUOROBORATE 30 MILLICURIE: 1 INJECTION, POWDER, LYOPHILIZED, FOR SOLUTION INTRAVENOUS at 12:30

## 2020-09-20 RX ADMIN — METHYLPREDNISOLONE SODIUM SUCCINATE 60 MG: 125 INJECTION, POWDER, FOR SOLUTION INTRAMUSCULAR; INTRAVENOUS at 06:11

## 2020-09-20 NOTE — PROGRESS NOTES
Instructed pt family member to call [facility] pharmacy to get meds transferred to pharmacy of her choice. Pt family member did not want to use [facility].   Electronically signed by Elías Feliciano RN on 9/20/2020 at 4:11 PM

## 2020-09-20 NOTE — PROGRESS NOTES
Pharmacy Renal Adjustment    Elaina Saleh is a 78 y.o. female. Pharmacy has renally adjusted medications per protocol. Recent Labs     09/20/20  0156   BUN 12       Recent Labs     09/20/20  0156   CREATININE 0.9       Estimated Creatinine Clearance: 49 mL/min (based on SCr of 0.9 mg/dL). Height:   Ht Readings from Last 1 Encounters:   09/20/20 5' 3\" (1.6 m)     Weight:  Wt Readings from Last 1 Encounters:   09/20/20 132 lb (59.9 kg)       Plan: Adjust the following medications based on renal function:          Pepcid to 20mg po daily.     Electronically signed by Roberto Xiao, [address] [place] [id number] on 9/20/2020 at 1:37 PM

## 2020-09-20 NOTE — PROGRESS NOTES
Spoke with Dr. Joslyn Nagel Pt. Is OK to go home after walking in hallway. Would like to follow up in the next month.    Electronically signed by Rabia Contreras RN on 9/20/2020 at 3:34 PM

## 2020-09-20 NOTE — PROGRESS NOTES
Cardiology Daily Note Phil Casillas MD      Patient:  Sixto Jeffries  [id number]    Patient Active Problem List    Diagnosis Date Noted    Tobacco abuse counseling 09/20/2020     Priority: Low    COPD (chronic obstructive pulmonary disease) ([facility] [address] 09/20/2020     Priority: Low    Cigarette nicotine dependence with nicotine-induced disorder 09/19/2020     Priority: Low    Essential hypertension 09/19/2020     Priority: Low    Elevated d-dimer 09/19/2020     Priority: Low    Encounter for diagnostic colonoscopy due to change in bowel habits 05/24/2016     Priority: Low    Weight loss, unintentional 05/24/2016     Priority: Low       Admit Date:  9/19/2020    Admission Problem List: Present on Admission:   (Resolved) Chest tightness   Cigarette nicotine dependence with nicotine-induced disorder   Essential hypertension   (Resolved) Dyspnea on exertion   Elevated d-dimer   Tobacco abuse counseling   COPD (chronic obstructive pulmonary disease) ([facility] [address]      Cardiac Specific Data:  Specialty Problems        Cardiology Problems    Essential hypertension              Subjective:  Ms. John Connolly seen today resting comfortably all troponins are negative no further reported chest pain spoke with patient and daughter at the bedside. Blood pressure 95/61 heart rate 79. Left ventricular function by echo satisfactory mild grade 1 diastolic dysfunction. Lexiscan stress test ejection fraction 60% no evidence of ischemia. Objective:   BP 95/61   Pulse 79   Temp 98.2 °F (36.8 °C) (Temporal)   Resp 14   Ht 5' 3\" (1.6 m)   Wt 132 lb (59.9 kg)   SpO2 94%   BMI 23.38 kg/m²       Intake/Output Summary (Last 24 hours) at 9/20/2020 1537  Last data filed at 9/19/2020 1951  Gross per 24 hour   Intake 360 ml   Output --   Net 360 ml       Prior to Admission medications    Medication Sig Start Date End Date Taking?  Authorizing Provider   aspirin 81 MG chewable tablet Take 1 tablet by mouth daily 9/21/20  Yes Caren Jacinto MD methylPREDNISolone (MEDROL DOSEPACK) 4 MG tablet Use as directed 9/20/20 9/26/20 Yes Brodie Light MD   albuterol sulfate HFA (PROVENTIL HFA) 108 (90 Base) MCG/ACT inhaler Inhale 2 puffs into the lungs every 6 hours as needed for Wheezing 9/20/20  Yes Brodie Light MD   sertraline (ZOLOFT) 100 MG tablet Take 100 mg by mouth daily   Yes Historical Provider, MD   meloxicam (MOBIC) 15 MG tablet Take 15 mg by mouth nightly     Historical Provider, MD   lamoTRIgine (LAMICTAL) 150 MG tablet Take 150 mg by mouth daily    Historical Provider, MD   atorvastatin (LIPITOR) 10 MG tablet Take 10 mg by mouth daily    Historical Provider, MD   QUEtiapine (SEROQUEL) 50 MG tablet Take 50 mg by mouth daily    Historical Provider, MD        methylPREDNISolone  40 mg Intravenous Q12H    [START ON 9/21/2020] famotidine  20 mg Oral Daily    lisinopril  10 mg Oral Daily    QUEtiapine  50 mg Oral Daily    sodium chloride flush  10 mL Intravenous 2 times per day    atorvastatin  40 mg Oral Nightly    aspirin  81 mg Oral Daily    nicotine  1 patch Transdermal Daily    ipratropium-albuterol  1 ampule Inhalation Q4H WA    lamoTRIgine  150 mg Oral Daily       TELEMETRY: Sinus     Physical Exam:      Physical Exam      General:  Awake, alert, NAD  Skin:  Warm and dry  Neck:  no jvd , no carotid bruits  Chest:  Clear to auscultation, no wheezing or rales  Cardiovascular:  RRR L8R2 no murmurs, clicks, gallups, or rubs  Abdomen:  Soft nontender, nondistended, bowel sounds present  Extremities:  Edema: none     Lab Data:  CBC:   Recent Labs     09/20/20  0156   WBC 6.0   HGB 13.3   HCT 40.4   MCV 93.3        BMP:   Recent Labs     09/20/20  0156      K 3.7      CO2 22   PHOS 3.3   BUN 12   CREATININE 0.9     LIVER PROFILE: No results for input(s): AST, ALT, LIPASE, BILIDIR, BILITOT, ALKPHOS in the last 72 hours. Invalid input(s):   AMYLASE,  ALB  PT/INR: No results for input(s): PROTIME, INR in the last 72 hours.  APTT: No results for input(s): APTT in the last 72 hours. BNP:  No results for input(s): BNP in the last 72 hours. CK, CKMB, Troponin: @LABRCNT (CKTOTAL:3, CKMB:3, TROPONINI:3)@    IMAGING:  Cta Pulmonary W Contrast    Result Date: 9/19/2020  CTA PULMONARY W CONTRAST 9/19/2020 10:29 AM HISTORY: Shortness of breath, elevated d-dimer COMPARISON: None. DLP: 345 mGy cm TECHNIQUE: Helical tomographic images of the chest were obtained after the administration of intravenous contrast following angiogram protocol. Additionally, 3D MIP reconstructions in the coronal and sagittal planes were provided. Automated exposure control was also utilized to decrease patient radiation dose. FINDINGS:  Pulmonary arteries: There is adequate enhancement of the pulmonary arteries to evaluate for central and segmental pulmonary emboli. There are no filling defects within the main, lobar, segmental or visualized subsegmental pulmonary arteries. The pulmonary vessels are within normal limits for size. Aorta and great vessels: The aorta is not well opacified with contrast due to the phase of the exam. Minimal coronary artery calcifications are visualized. Neck base: The imaged portion of the base of the neck appears unremarkable. Lungs: Mild centrilobular emphysema. Minimal debris in the bilateral lower lobe segmental and subsegmental airways. No consolidation. No pleural effusion. Heart: Mildly enlarged. There is no pericardial effusion. Lymph nodes: No pathologically enlarged mediastinal, hilar, or axillary lymph nodes are present. Small hiatal hernia. Bones and soft tissues: There are multiple chronic left posterior rib fractures. No acute bony abnormality. Upper abdomen: Nonobstructing bilateral renal stones. Partially imaged fusiform aneurysm of the abdominal aorta, measuring just over 3 cm along the included portion. 1. No evidence of pulmonary embolus.  2. Minimal debris in the bilateral lower lobe segmental and subsegmental airways, either mucus or perhaps minimal aspiration. No consolidation. 3. Moderate cardiomegaly. Signed by Dr Skyla Ann on 9/19/2020 12:36 PM    Nm Myocardial Spect Rest Exercise Or Rx    Result Date: 9/20/2020  Lexiscan Nuclear Stress Test Report Procedure date: 09-20-20 Indications: chest pain Procedure: Stress was performed with injection of 0.4 mg Lexiscan. Vital signs and EKG were monitored. Technetium-99 sestamibi was injected in divided doses, approximately 10 mCi and 30 mCi respectively for rest and stress imaging. The patient was discharged in stable condition. Results: Patient had symptoms of dyspnea, nausea, abdominal cramping during infusion that resolved in recovery. Baseline EKG showed . During stress there were no significant EKG changes or rhythm changes. Baseline and peak blood pressures were 104/62, and 111/48 respectively. Baseline and peak heart rates were 63 and  103 respectively. Review of rest and stress images obtained utilizing a gated SPECT acquisition protocol along with review of the polar plot revealed: 1. Ejection fraction 60% 2. Wall motion study unremarkable 3. Myocardial perfusion imaging demonstrated homogeneous uptake of the tracer in all visualized segments no definite areas of ischemia or infarction are identified. Summary impressions: Normal ejection fraction 60% probably normal perfusion study no evidence of ischemia or infarction identified correlate clinically. Signed by Dr Thierno Molina on 9/20/2020 2:14 PM        Assessment:  1. Vague complaints of dyspnea clamminess pushing sensation on her chest anxiety etiology not certain cannot exclude unstable angina  2. CTA pulmonary today no evidence of pulmonary embolism or other significant abnormalities cardiomegaly noted  3. Hypertension  4. Tobacco abuse 1/2 pack/day  5. Elevated d-dimer 0.69  6. Dyspnea on exertion    Plan:  1.  Increase activity as tolerated if doing well could be potentially discharged  2. Suggest aspirin 81 mg daily for now  3.  Recommend follow-up assessment in our office in 1 month    Jose Luis Dawn MD 9/20/2020 3:37 PM

## 2020-09-20 NOTE — DISCHARGE SUMMARY
[place], [place], [address]        DEPARTMENT OF HOSPITALIST MEDICINE        DISCHARGE SUMMARY:      PATIENT NAME:  Kindra Faria  :  1950  MRN:  [id number]    Admission Date:   2020 10:31 AM Attending: Vilma Tomas MD   Discharge Date:   2020              PCP: Dr. Alirio Brandon M.D., MD  Length of Stay: 0 days     Chief Complaint on Admission: No chief complaint on file. Consultants:     IP CONSULT TO CARDIOLOGY       Discharge Problem List:   Principal Problem:    COPD (chronic obstructive pulmonary disease) ([facility] [address]  Active Problems:    Cigarette nicotine dependence with nicotine-induced disorder    Essential hypertension    Elevated d-dimer    Tobacco abuse counseling  Resolved Problems:    Chest tightness    Dyspnea on exertion       Dependence on nicotine from cigarettes          Tobacco abuse counseling  Patient smokes cigarettes on a chronic basis. Strictly advised patient to cut down on or quit smoking. Nicotine patch offered. ~5 minutes spent on tobacco cessation counseling with the patient. Websites - http://smokefree. gov & LegalWarrants.Cloudwise. com   °Quitlines - 1-800-QUIT-NOW (6-145-000-581-539-1253)   °Smokefree Apps - QuitSTART Darren   °Text Messages - SmokefreeTXT (send the word QUIT to [id number])       Alberto [address]  TREATMENT:    2020  Patient feels much better today. Her shortness of breath and chest pain has resolved. She underwent nuclear stress test which was negative as below. She is stable from cardiology standpoint to be discharged. Her CT of the chest was negative ruling out pulmonary embolism. She is developing early COPD which is the likely explanation for her symptoms. I will give her Medrol Dosepak as she responded well to IV steroids as well as albuterol inhaler to be used as directed. Her blood pressure is hovering towards low normal.  Patient was supposed to be taking lisinopril but she is not taking that at home.   I will Recent Labs     09/19/20  1238 09/19/20  1746 09/20/20  0156   TROPONINI <0.01 <0.01 <0.01     Pro-BNP: No results for input(s): BNP in the last 72 hours. INR: No results for input(s): INR in the last 72 hours. UA:No results for input(s): NITRITE, COLORU, PHUR, LABCAST, WBCUA, RBCUA, MUCUS, TRICHOMONAS, YEAST, BACTERIA, CLARITYU, SPECGRAV, LEUKOCYTESUR, UROBILINOGEN, BILIRUBINUR, BLOODU, GLUCOSEU, AMORPHOUS in the last 72 hours. Invalid input(s): [facility]  A1C: No results for input(s): LABA1C in the last 72 hours. ABG:No results for input(s): PHART, ETU4RWT, PO2ART, XHY5KOK, BEART, HGBAE, R1TSDJEL, CARBOXHGBART in the last 72 hours. Impressions of imaging performed in 48 hours before discharge:    Cta Pulmonary W Contrast    Result Date: 9/19/2020  1. No evidence of pulmonary embolus. 2. Minimal debris in the bilateral lower lobe segmental and subsegmental airways, either mucus or perhaps minimal aspiration. No consolidation. 3. Moderate cardiomegaly.  Signed by Dr Chadd Mae on 9/19/2020 12:36 PM        Jorge Sumner   Home Medication Instructions ELT:[id number]    Printed on:09/20/20 1502   Medication Information                      albuterol sulfate HFA (PROVENTIL HFA) 108 (90 Base) MCG/ACT inhaler  Inhale 2 puffs into the lungs every 6 hours as needed for Wheezing             aspirin 81 MG chewable tablet  Take 1 tablet by mouth daily             atorvastatin (LIPITOR) 10 MG tablet  Take 10 mg by mouth daily             lamoTRIgine (LAMICTAL) 150 MG tablet  Take 150 mg by mouth daily             meloxicam (MOBIC) 15 MG tablet  Take 15 mg by mouth nightly              methylPREDNISolone (MEDROL DOSEPACK) 4 MG tablet  Use as directed             QUEtiapine (SEROQUEL) 50 MG tablet  Take 50 mg by mouth daily             sertraline (ZOLOFT) 100 MG tablet  Take 100 mg by mouth daily                   ISSUES TO BE ADDRESSED AT HOSPITAL FOLLOW-UP VISIT:  Advised patient to follow-up closely with PCP for management of her chronic medical issues    Condition on Discharge: gradually improving  Discharge Disposition: Home    Recommended Follow Up:  Kary Freitas MD  [address] [id number]  [phone]          Followup Appointments Scheduled at Time of Discharge:  No future appointments. Discharge Instructions:   Please see the discharge paperwork. Patient was seen at bedside today, and the examination shows improvement since yesterday. Detailed discharge directions delivered to the patient by myself and our nursing staff, who verbalizes understanding and is very happy and satisfied with the plan. Patient has been advised to continue all medications as prescribed and advised, and f/u with PCP within 1 week. Patient is stable from medical standpoint to be discharged. Total time spent during patient evaluation and assessment, discussion with the nurse/family, addressing discharge medications/scripts and coordination of care for safe discharge was in excess of 35 minutes.       Signed Electronically:    Padmini Griffith MD  3:04 PM 9/20/2020

## 2020-09-21 LAB
LV EF: 60 %
LVEF MODALITY: NORMAL

## 2020-10-03 DIAGNOSIS — F39 MOOD DISORDER (HCC): ICD-10-CM

## 2020-10-05 RX ORDER — QUETIAPINE FUMARATE 100 MG/1
TABLET, FILM COATED ORAL
Qty: 90 TABLET | Refills: 3 | OUTPATIENT
Start: 2020-10-05

## 2020-10-16 ENCOUNTER — OFFICE VISIT (OUTPATIENT)
Dept: FAMILY MEDICINE CLINIC | Facility: CLINIC | Age: 70
End: 2020-10-16

## 2020-10-16 VITALS
BODY MASS INDEX: 22.49 KG/M2 | OXYGEN SATURATION: 98 % | TEMPERATURE: 97.7 F | WEIGHT: 131 LBS | DIASTOLIC BLOOD PRESSURE: 79 MMHG | SYSTOLIC BLOOD PRESSURE: 147 MMHG | HEART RATE: 83 BPM

## 2020-10-16 DIAGNOSIS — F41.9 ANXIETY: ICD-10-CM

## 2020-10-16 DIAGNOSIS — Z23 NEED FOR IMMUNIZATION AGAINST INFLUENZA: ICD-10-CM

## 2020-10-16 DIAGNOSIS — E78.00 HYPERCHOLESTEROLEMIA: ICD-10-CM

## 2020-10-16 DIAGNOSIS — K21.9 GASTROESOPHAGEAL REFLUX DISEASE WITHOUT ESOPHAGITIS: ICD-10-CM

## 2020-10-16 DIAGNOSIS — Z12.31 ENCOUNTER FOR SCREENING MAMMOGRAM FOR MALIGNANT NEOPLASM OF BREAST: ICD-10-CM

## 2020-10-16 DIAGNOSIS — Z72.0 TOBACCO USER: ICD-10-CM

## 2020-10-16 DIAGNOSIS — M19.90 ARTHRITIS: ICD-10-CM

## 2020-10-16 DIAGNOSIS — I10 ESSENTIAL HYPERTENSION: ICD-10-CM

## 2020-10-16 DIAGNOSIS — F39 MOOD DISORDER (HCC): Primary | ICD-10-CM

## 2020-10-16 PROCEDURE — 99406 BEHAV CHNG SMOKING 3-10 MIN: CPT | Performed by: FAMILY MEDICINE

## 2020-10-16 PROCEDURE — 99214 OFFICE O/P EST MOD 30 MIN: CPT | Performed by: FAMILY MEDICINE

## 2020-10-16 RX ORDER — LAMOTRIGINE 150 MG/1
150 TABLET ORAL DAILY
Qty: 90 TABLET | Refills: 1 | Status: SHIPPED | OUTPATIENT
Start: 2020-10-16

## 2020-10-16 RX ORDER — LAMOTRIGINE 150 MG/1
150 TABLET ORAL DAILY
Qty: 30 TABLET | Refills: 0 | Status: SHIPPED | OUTPATIENT
Start: 2020-10-16 | End: 2020-10-16 | Stop reason: SDUPTHER

## 2020-10-16 RX ORDER — QUETIAPINE FUMARATE 100 MG/1
100 TABLET, FILM COATED ORAL
Qty: 30 TABLET | Refills: 0 | Status: SHIPPED | OUTPATIENT
Start: 2020-10-16 | End: 2020-10-16 | Stop reason: SDUPTHER

## 2020-10-16 RX ORDER — SERTRALINE HYDROCHLORIDE 100 MG/1
100 TABLET, FILM COATED ORAL
Qty: 90 TABLET | Refills: 1 | Status: SHIPPED | OUTPATIENT
Start: 2020-10-16

## 2020-10-16 RX ORDER — MELOXICAM 15 MG/1
15 TABLET ORAL DAILY
Qty: 30 TABLET | Refills: 0 | Status: SHIPPED | OUTPATIENT
Start: 2020-10-16 | End: 2020-10-16 | Stop reason: SDUPTHER

## 2020-10-16 RX ORDER — ATORVASTATIN CALCIUM 10 MG/1
10 TABLET, FILM COATED ORAL DAILY
Qty: 30 TABLET | Refills: 0 | Status: SHIPPED | OUTPATIENT
Start: 2020-10-16 | End: 2020-10-16 | Stop reason: SDUPTHER

## 2020-10-16 RX ORDER — PANTOPRAZOLE SODIUM 40 MG/1
40 TABLET, DELAYED RELEASE ORAL EVERY MORNING
Qty: 30 TABLET | Refills: 0 | Status: SHIPPED | OUTPATIENT
Start: 2020-10-16 | End: 2020-10-16 | Stop reason: SDUPTHER

## 2020-10-16 RX ORDER — PANTOPRAZOLE SODIUM 40 MG/1
40 TABLET, DELAYED RELEASE ORAL EVERY MORNING
Qty: 90 TABLET | Refills: 1 | Status: SHIPPED | OUTPATIENT
Start: 2020-10-16 | End: 2021-04-15

## 2020-10-16 RX ORDER — SERTRALINE HYDROCHLORIDE 100 MG/1
100 TABLET, FILM COATED ORAL
Qty: 30 TABLET | Refills: 0 | Status: SHIPPED | OUTPATIENT
Start: 2020-10-16 | End: 2020-10-16 | Stop reason: SDUPTHER

## 2020-10-16 RX ORDER — MELOXICAM 15 MG/1
15 TABLET ORAL DAILY
Qty: 90 TABLET | Refills: 1 | Status: SHIPPED | OUTPATIENT
Start: 2020-10-16

## 2020-10-16 RX ORDER — ATORVASTATIN CALCIUM 10 MG/1
10 TABLET, FILM COATED ORAL DAILY
Qty: 90 TABLET | Refills: 1 | Status: SHIPPED | OUTPATIENT
Start: 2020-10-16

## 2020-10-16 RX ORDER — QUETIAPINE FUMARATE 100 MG/1
100 TABLET, FILM COATED ORAL
Qty: 90 TABLET | Refills: 1 | Status: SHIPPED | OUTPATIENT
Start: 2020-10-16 | End: 2021-04-02

## 2020-10-16 NOTE — PROGRESS NOTES
OFFICE VISIT NOTE:    Isela Mejía is a 70 y.o. female who presents today for Med Refill.     Labs reviewed from late Sept 2020 - all fairly good.     Isela Mejía  reports that she has been smoking cigarettes. She has a 15.00 pack-year smoking history. She has never used smokeless tobacco.. I have educated her on the risk of diseases from using tobacco products such as cancer, COPD and heart disease.     I advised her to quit and she is not willing to quit.    I spent 6.5 minutes counseling the patient.         Anemia  Presents for follow-up visit. Symptoms include malaise/fatigue. There has been no abdominal pain, bruising/bleeding easily, fever, palpitations or weight loss. Signs of blood loss that are not present include hematemesis, hematochezia, melena, menorrhagia and vaginal bleeding. There are no compliance problems.  Compliance with medications is %.   Hypertension  This is a chronic problem. The current episode started more than 1 year ago. The problem is unchanged. The problem is controlled. Associated symptoms include malaise/fatigue and peripheral edema. Pertinent negatives include no chest pain, headaches, orthopnea, palpitations, PND or shortness of breath. There are no associated agents to hypertension. The current treatment provides significant improvement. There are no compliance problems.         Past medical/surgical history, Family history, Social history, Allergies and Medications have been reviewed with the patient today and are updated in [facility] EMR. See below.  Past Medical History:   Diagnosis Date   • Anemia    • Anxiety    • Arthritis    • Insomnia      Past Surgical History:   Procedure Laterality Date   • COLONOSCOPY     • REPLACEMENT TOTAL KNEE Right    • TUBAL ABDOMINAL LIGATION       Family History   Problem Relation Age of Onset   • Cancer Mother    • Cancer Father      Social History     Tobacco Use   • Smoking status: Current Every Day Smoker     Packs/day: 0.50     Years:  30.00     Pack years: 15.00     Types: Cigarettes   • Smokeless tobacco: Never Used   Substance Use Topics   • Alcohol use: No     Frequency: Never     Binge frequency: Never   • Drug use: No       ALLERGIES:  Patient has no known allergies.    CURRENT MEDS:    Current Outpatient Medications:   •  atorvastatin (LIPITOR) 10 MG tablet, Take 1 tablet by mouth Daily., Disp: 90 tablet, Rfl: 1  •  lamoTRIgine (LaMICtal) 150 MG tablet, Take 1 tablet by mouth Daily., Disp: 90 tablet, Rfl: 1  •  meloxicam (MOBIC) 15 MG tablet, Take 1 tablet by mouth Daily., Disp: 90 tablet, Rfl: 1  •  pantoprazole (PROTONIX) 40 MG EC tablet, Take 1 tablet by mouth Every Morning., Disp: 90 tablet, Rfl: 1  •  QUEtiapine (SEROquel) 100 MG tablet, Take 1 tablet by mouth every night at bedtime., Disp: 90 tablet, Rfl: 1  •  sertraline (ZOLOFT) 100 MG tablet, Take 1 tablet by mouth every night at bedtime., Disp: 90 tablet, Rfl: 1    REVIEW OF SYSTEMS:  Review of Systems   Constitutional: Positive for malaise/fatigue. Negative for activity change, fatigue, fever, unexpected weight gain and unexpected weight loss.   Respiratory: Negative for shortness of breath.    Cardiovascular: Negative for chest pain, palpitations, orthopnea and PND.   Gastrointestinal: Negative for abdominal pain, hematemesis, hematochezia and melena.   Genitourinary: Negative for difficulty urinating, menorrhagia and vaginal bleeding.   Skin: Negative for rash.   Neurological: Negative for syncope and headache.   Hematological: Does not bruise/bleed easily.       PHYSICAL EXAMINATION:  Vital Signs:  /79 (BP Location: Right arm, Patient Position: Sitting, Cuff Size: Adult)   Pulse 83   Temp 97.7 °F (36.5 °C) (Temporal)   Wt 59.4 kg (131 lb)   LMP  (LMP Unknown)   SpO2 98%   Breastfeeding No   BMI 22.49 kg/m²   Vitals:    10/16/20 1230   Patient Position: Sitting       Physical Exam  Vitals signs and nursing note reviewed.   Constitutional:       General: She is not  in acute distress.     Appearance: She is well-developed.   HENT:      Head: Normocephalic and atraumatic.      Nose: Nose normal.      Mouth/Throat:      Mouth: Mucous membranes are moist.      Pharynx: Oropharynx is clear.   Eyes:      Extraocular Movements: Extraocular movements intact.      Pupils: Pupils are equal, round, and reactive to light.   Neck:      Musculoskeletal: Normal range of motion and neck supple.      Vascular: No JVD.   Cardiovascular:      Rate and Rhythm: Normal rate and regular rhythm.      Pulses: Normal pulses.      Heart sounds: Normal heart sounds.   Pulmonary:      Effort: Pulmonary effort is normal. No respiratory distress.      Breath sounds: Rhonchi present.   Abdominal:      General: Bowel sounds are normal. There is no distension.      Palpations: Abdomen is soft.      Tenderness: There is no abdominal tenderness.   Musculoskeletal: Normal range of motion.      Right lower leg: Edema present.      Left lower leg: Edema present.   Skin:     General: Skin is warm and dry.      Capillary Refill: Capillary refill takes less than 2 seconds.      Findings: No rash.   Neurological:      General: No focal deficit present.      Mental Status: She is alert and oriented to person, place, and time.      Cranial Nerves: No cranial nerve deficit.   Psychiatric:         Mood and Affect: Mood normal.         Behavior: Behavior normal.         Procedures    ASSESSMENT/ PLAN:  Problem List Items Addressed This Visit        Cardiovascular and Mediastinum    Hypercholesterolemia    Relevant Medications    atorvastatin (LIPITOR) 10 MG tablet    Essential hypertension       Musculoskeletal and Integument    Arthritis    Relevant Medications    meloxicam (MOBIC) 15 MG tablet       Other    Anxiety    Mood disorder (CMS/HCC) - Primary    Relevant Medications    lamoTRIgine (LaMICtal) 150 MG tablet    QUEtiapine (SEROquel) 100 MG tablet    sertraline (ZOLOFT) 100 MG tablet    Tobacco user      Other Visit  Diagnoses     Encounter for screening mammogram for malignant neoplasm of breast        Relevant Orders    Mammo Screening Bilateral With CAD    Need for immunization against influenza        Relevant Orders    Fluarix Quad >6 Months ([id number])    Gastroesophageal reflux disease without esophagitis        Relevant Medications    pantoprazole (PROTONIX) 40 MG EC tablet            Specific Patient Instructions:  MEDICATION Instructions: Encouraged patient to continue routine medicines as prescribed and maintain compliance. Patient instructed to report any adverse side effects or reactions to medicines promptly to the office. Patient instructed to make us aware of any OTC or herbal meds or supplement use.    DIET Recommendations: Patient instructed and provided information on the following nutrition and diet recommendations: Calorie restriction for weight reduction and maintenance. Necessity for adequate daily intake of fluids/water. Also, diet information provided in AVS for specifics.    EXERCISE Instructions: Discussed with patient the need for routine aerobic activity for cardiovascular fitness, 3 times a week for about 30 minutes. Daily exercise for increased fitness and weight reduction goals.    SMOKING Recommendations: Counseled patient and encouraged them on smoking and tobacco cessation if applicable. Discussed the benefits to all body systems with smoking/tobacco cessation, including decreased cardiac/lung/stroke/cancer risk. Encouraged no vaping use.    HEALTH MAINTENANCE:  Counseling provided to patient/family about routine health maintenance and ANNUAL physicals/labs. Counseling on recommended Vaccinations appropriate for age needed.        Patient's Body mass index is 22.49 kg/m². BMI is within normal parameters. No follow-up required..      Medications or Orders placed this visit:  Orders Placed This Encounter   Procedures   • Mammo Screening Bilateral With CAD     Standing Status:   Future      Standing Expiration Date:   10/16/2021     Scheduling Instructions:      Pt requests Alla.     Order Specific Question:   Reason for Exam:     Answer:   screening   • Fluarix Quad >6 Months ([id number])       Medications DISCONTINUED this visit:  Medications Discontinued During This Encounter   Medication Reason   • pantoprazole (PROTONIX) 40 MG EC tablet Reorder   • atorvastatin (LIPITOR) 10 MG tablet Reorder   • QUEtiapine (SEROquel) 100 MG tablet Reorder   • lamoTRIgine (LaMICtal) 150 MG tablet Reorder   • sertraline (ZOLOFT) 100 MG tablet Reorder   • meloxicam (MOBIC) 15 MG tablet Reorder   • atorvastatin (LIPITOR) 10 MG tablet Reorder   • lamoTRIgine (LaMICtal) 150 MG tablet Reorder   • meloxicam (MOBIC) 15 MG tablet Reorder   • pantoprazole (PROTONIX) 40 MG EC tablet Reorder   • QUEtiapine (SEROquel) 100 MG tablet Reorder   • sertraline (ZOLOFT) 100 MG tablet Reorder       FOLLOW-UP:  Return in about 3 months (around 1/16/2021) for Recheck, Next scheduled follow up.    I discussed the patients findings and my recommendations with patient.  An After Visit Summary (AVS) was printed and given to the patient at discharge.        Heonk Torres MD, FAAFP  10/16/2020

## 2020-10-16 NOTE — PATIENT INSTRUCTIONS
Suspect Essential HTN.Good BP control is encouraged with Goal BP based on JNC 8 guidelines 2014 <140/90 for patients with known cardiac disease and diabetes. (SOL. 2014:322 (5):507-520. doi:10.1001/sol.2013.04518): general population <60 yr old goal BP <140/90 and for those >60 <150/90.  For patients of all ages with Diabetes, CKD, Known CAD <140/90. Recommended to the patient to obtain electronic home BP machine with upper arm blood pressure cuff and to check regularly as instructed.  Keep BP log and bring to subsequent visits. Stable, at goal.  a. LABS: routine for hypertension recommended and ordered if necessary.  b. Recommend if you do not have a home BP machine to obtain an electronic machine with arm blood pressure cuff.      c. Monitor BP over the next week and keep log to bring back to office. Discussed medication therapy however pt wants to try to control with diet exercise. .  Your provider  has recommended self-monitoring of your blood pressure.  If you do not have a blood pressure cuff you may purchase one from the local pharmacy.  You may ask the pharmacist which brand and model they recommend.  Obtain your blood pressure measurement at least 2x per week.  You should also check your blood pressure if you experience any symptoms of blurred visit, dizziness or headache.  Please record all blood pressure measurements and bring them to next office visit.  If you have any questions about the accuracy of your blood pressure machine please bring it in to the office and our staff will be happy to check accuracy.   d. Encouraged to eat a low sodium heart healthy diet  e. Offered handout on HTN educational topics.  These were provided if patient requested these today.  f. MEDS: as listed in today's visit.  g. Risks/benefits of current and new medications discussed with the patient and or family today.  The patient/family are aware and accept that if there any side effects they should call or return to clinic  as soon as possible.  Appropriate F/U discussed for topics addressed today. All questions were answered to the  satisfactory state of patient/family.  Should symptoms fail to improve or worsen they agree to call or return to clinic or to go to the ER. Education handouts were offered on any new Rx if requested.  Discussed the importance of following up with any needed screening tests/labs/specialist appointments and any requested follow-up recommended by me today.  Importance of maintaining follow-up discussed and patient accepts that missed appointments can delay diagnosis and potentially lead to worsening of conditions.      Generalized Anxiety Disorder, Adult  Generalized anxiety disorder (MATT) is a mental health disorder. People with this condition constantly worry about everyday events. Unlike normal anxiety, worry related to MATT is not triggered by a specific event. These worries also do not fade or get better with time. MATT interferes with life functions, including relationships, work, and school.  MATT can vary from mild to severe. People with severe MATT can have intense waves of anxiety with physical symptoms (panic attacks).  What are the causes?  The exact cause of MATT is not known.  What increases the risk?  This condition is more likely to develop in:  · Women.  · People who have a family history of anxiety disorders.  · People who are very shy.  · People who experience very stressful life events, such as the death of a loved one.  · People who have a very stressful family environment.  What are the signs or symptoms?  People with MATT often worry excessively about many things in their lives, such as their health and family. They may also be overly concerned about:  · Doing well at work.  · Being on time.  · Natural disasters.  · Friendships.  Physical symptoms of MATT include:  · Fatigue.  · Muscle tension or having muscle twitches.  · Trembling or feeling shaky.  · Being easily startled.  · Feeling like  your heart is pounding or racing.  · Feeling out of breath or like you cannot take a deep breath.  · Having trouble falling asleep or staying asleep.  · Sweating.  · Nausea, diarrhea, or irritable bowel syndrome (IBS).  · Headaches.  · Trouble concentrating or remembering facts.  · Restlessness.  · Irritability.  How is this diagnosed?  Your health care provider can diagnose MATT based on your symptoms and medical history. You will also have a physical exam. The health care provider will ask specific questions about your symptoms, including how severe they are, when they started, and if they come and go. Your health care provider may ask you about your use of alcohol or drugs, including prescription medicines. Your health care provider may refer you to a mental health specialist for further evaluation.  Your health care provider will do a thorough examination and may perform additional tests to rule out other possible causes of your symptoms.  To be diagnosed with MATT, a person must have anxiety that:  · Is out of his or her control.  · Affects several different aspects of his or her life, such as work and relationships.  · Causes distress that makes him or her unable to take part in normal activities.  · Includes at least three physical symptoms of MATT, such as restlessness, fatigue, trouble concentrating, irritability, muscle tension, or sleep problems.  Before your health care provider can confirm a diagnosis of MATT, these symptoms must be present more days than they are not, and they must last for six months or longer.  How is this treated?  The following therapies are usually used to treat MATT:  · Medicine. Antidepressant medicine is usually prescribed for long-term daily control. Antianxiety medicines may be added in severe cases, especially when panic attacks occur.  · Talk therapy (psychotherapy). Certain types of talk therapy can be helpful in treating MATT by providing support, education, and guidance.  Options include:  ? Cognitive behavioral therapy (CBT). People learn coping skills and techniques to ease their anxiety. They learn to identify unrealistic or negative thoughts and behaviors and to replace them with positive ones.  ? Acceptance and commitment therapy (ACT). This treatment teaches people how to be mindful as a way to cope with unwanted thoughts and feelings.  ? Biofeedback. This process trains you to manage your body's response (physiological response) through breathing techniques and relaxation methods. You will work with a therapist while machines are used to monitor your physical symptoms.  · Stress management techniques. These include yoga, meditation, and exercise.  A mental health specialist can help determine which treatment is best for you. Some people see improvement with one type of therapy. However, other people require a combination of therapies.  Follow these instructions at home:  · Take over-the-counter and prescription medicines only as told by your health care provider.  · Try to maintain a normal routine.  · Try to anticipate stressful situations and allow extra time to manage them.  · Practice any stress management or self-calming techniques as taught by your health care provider.  · Do not punish yourself for setbacks or for not making progress.  · Try to recognize your accomplishments, even if they are small.  · Keep all follow-up visits as told by your health care provider. This is important.  Contact a health care provider if:  · Your symptoms do not get better.  · Your symptoms get worse.  · You have signs of depression, such as:  ? A persistently sad, cranky, or irritable mood.  ? Loss of enjoyment in activities that used to bring you terese.  ? Change in weight or eating.  ? Changes in sleeping habits.  ? Avoiding friends or family members.  ? Loss of energy for normal tasks.  ? Feelings of guilt or worthlessness.  Get help right away if:  · You have serious thoughts about  hurting yourself or others.  If you ever feel like you may hurt yourself or others, or have thoughts about taking your own life, get help right away. You can go to your nearest emergency department or call:  · Your local emergency services (911 in the [place]  · A suicide crisis helpline, such as the National Suicide Prevention Lifeline at 1-559.503.5214. This is open 24 hours a day.  Summary  · Generalized anxiety disorder (MATT) is a mental health disorder that involves worry that is not triggered by a specific event.  · People with MATT often worry excessively about many things in their lives, such as their health and family.  · MATT may cause physical symptoms such as restlessness, trouble concentrating, sleep problems, frequent sweating, nausea, diarrhea, headaches, and trembling or muscle twitching.  · A mental health specialist can help determine which treatment is best for you. Some people see improvement with one type of therapy. However, other people require a combination of therapies.  This information is not intended to replace advice given to you by your health care provider. Make sure you discuss any questions you have with your health care provider.  Document Released: 04/14/2014 Document Revised: 11/30/2018 Document Reviewed: 11/07/2017  [employer] Patient Education © 2020 [employer]      Living With Depression  Everyone experiences occasional disappointment, sadness, and loss in their lives. When you are feeling down, blue, or sad for at least 2 weeks in a row, it may mean that you have depression. Depression can affect your thoughts and feelings, relationships, daily activities, and physical health. It is caused by changes in the way your brain functions. If you receive a diagnosis of depression, your health care provider will tell you which type of depression you have and what treatment options are available to you.  If you are living with depression, there are ways to help you recover from it and  also ways to prevent it from coming back.  How to cope with lifestyle changes  Coping with stress         Stress is your body’s reaction to life changes and events, both good and bad. Stressful situations may include:  · Getting .  · The death of a spouse.  · Losing a job.  · Retiring.  · Having a baby.  Stress can last just a few hours or it can be ongoing. Stress can play a major role in depression, so it is important to learn both how to cope with stress and how to think about it differently.  Talk with your health care provider or a counselor if you would like to learn more about stress reduction. He or she may suggest some stress reduction techniques, such as:  · Music therapy. This can include creating music or listening to music. Choose music that you enjoy and that inspires you.  · Mindfulness-based meditation. This kind of meditation can be done while sitting or walking. It involves being aware of your normal breaths, rather than trying to control your breathing.  · Centering prayer. This is a kind of meditation that involves focusing on a spiritual word or phrase. Choose a word, phrase, or sacred image that is meaningful to you and that brings you peace.  · Deep breathing. To do this, expand your stomach and inhale slowly through your nose. Hold your breath for 3-5 seconds, then exhale slowly, allowing your stomach muscles to relax.  · Muscle relaxation. This involves intentionally tensing muscles then relaxing them.  Choose a stress reduction technique that fits your lifestyle and personality. Stress reduction techniques take time and practice to develop. Set aside 5-15 minutes a day to do them. Therapists can offer training in these techniques. The training may be covered by some insurance plans. Other things you can do to manage stress include:  · Keeping a stress diary. This can help you learn what triggers your stress and ways to control your response.  · Understanding what your limits are and  saying no to requests or events that lead to a schedule that is too full.  · Thinking about how you respond to certain situations. You may not be able to control everything, but you can control how you react.  · Adding humor to your life by watching funny films or TV shows.  · Making time for activities that help you relax and not feeling guilty about spending your time this way.    Medicines  Your health care provider may suggest certain medicines if he or she feels that they will help improve your condition. Avoid using alcohol and other substances that may prevent your medicines from working properly (may interact). It is also important to:  · Talk with your pharmacist or health care provider about all the medicines that you take, their possible side effects, and what medicines are safe to take together.  · Make it your goal to take part in all treatment decisions (shared decision-making). This includes giving input on the side effects of medicines. It is best if shared decision-making with your health care provider is part of your total treatment plan.  If your health care provider prescribes a medicine, you may not notice the full benefits of it for 4-8 weeks. Most people who are treated for depression need to be on medicine for at least 6-12 months after they feel better. If you are taking medicines as part of your treatment, do not stop taking medicines without first talking to your health care provider. You may need to have the medicine slowly decreased (tapered) over time to decrease the risk of harmful side effects.  Relationships  Your health care provider may suggest family therapy along with individual therapy and drug therapy. While there may not be family problems that are causing you to feel depressed, it is still important to make sure your family learns as much as they can about your mental health. Having your family’s support can help make your treatment successful.  How to recognize changes in  your condition  Everyone has a different response to treatment for depression. Recovery from major depression happens when you have not had signs of major depression for two months. This may mean that you will start to:  · Have more interest in doing activities.  · Feel less hopeless than you did 2 months ago.  · Have more energy.  · Overeat less often, or have better or improving appetite.  · Have better concentration.  Your health care provider will work with you to decide the next steps in your recovery. It is also important to recognize when your condition is getting worse. Watch for these signs:  · Having fatigue or low energy.  · Eating too much or too little.  · Sleeping too much or too little.  · Feeling restless, agitated, or hopeless.  · Having trouble concentrating or making decisions.  · Having unexplained physical complaints.  · Feeling irritable, angry, or aggressive.  Get help as soon as you or your family members notice these symptoms coming back.  How to get support and help from others  How to talk with friends and family members about your condition    Talking to friends and family members about your condition can provide you with one way to get support and guidance. Reach out to trusted friends or family members, explain your symptoms to them, and let them know that you are working with a health care provider to treat your depression.  Financial resources  Not all insurance plans cover mental health care, so it is important to check with your insurance carrier. If paying for co-pays or counseling services is a problem, search for a local or [facility] mental health care center. They may be able to offer public mental health care services at low or no cost when you are not able to see a private health care provider.  If you are taking medicine for depression, you may be able to get the generic form, which may be less expensive. Some makers of prescription medicines also offer help to patients who  cannot afford the medicines they need.  Follow these instructions at home:    · Get the right amount and quality of sleep.  · Cut down on using caffeine, tobacco, alcohol, and other potentially harmful substances.  · Try to exercise, such as walking or lifting small weights.  · Take over-the-counter and prescription medicines only as told by your health care provider.  · Eat a healthy diet that includes plenty of vegetables, fruits, whole grains, low-fat dairy products, and lean protein. Do not eat a lot of foods that are high in solid fats, added sugars, or salt.  · Keep all follow-up visits as told by your health care provider. This is important.  Contact a health care provider if:  · You stop taking your antidepressant medicines, and you have any of these symptoms:  ? Nausea.  ? Headache.  ? Feeling lightheaded.  ? Chills and body aches.  ? Not being able to sleep (insomnia).  · You or your friends and family think your depression is getting worse.  Get help right away if:  · You have thoughts of hurting yourself or others.  If you ever feel like you may hurt yourself or others, or have thoughts about taking your own life, get help right away. You can go to your nearest emergency department or call:  · Your local emergency services (911 in the [place]  · A suicide crisis helpline, such as the National Suicide Prevention Lifeline at 1-521.258.7323. This is open 24-hours a day.  Summary  · If you are living with depression, there are ways to help you recover from it and also ways to prevent it from coming back.  · Work with your health care team to create a management plan that includes counseling, stress management techniques, and healthy lifestyle habits.  This information is not intended to replace advice given to you by your health care provider. Make sure you discuss any questions you have with your health care provider.  Document Released: 11/20/2017 Document Revised: 04/10/2020 Document Reviewed:  11/20/2017  [employer] Patient Education © 2020 [employer]

## 2020-10-27 DIAGNOSIS — Z12.31 ENCOUNTER FOR SCREENING MAMMOGRAM FOR MALIGNANT NEOPLASM OF BREAST: Primary | ICD-10-CM

## 2021-04-01 DIAGNOSIS — F39 MOOD DISORDER (HCC): ICD-10-CM

## 2021-04-02 RX ORDER — QUETIAPINE FUMARATE 100 MG/1
TABLET, FILM COATED ORAL
Qty: 90 TABLET | Refills: 3 | Status: SHIPPED | OUTPATIENT
Start: 2021-04-02 | End: 2022-03-28

## 2021-04-14 DIAGNOSIS — K21.9 GASTROESOPHAGEAL REFLUX DISEASE WITHOUT ESOPHAGITIS: ICD-10-CM

## 2021-04-14 NOTE — TELEPHONE ENCOUNTER
Last RX: 10.16.2020- Qty 90/ 1 refill  Last OV:  10.16.2020    Pt no showed 3 month f/u. Called pt and left vm to return call.  Pt needs to schedule f/u appt.

## 2021-04-15 RX ORDER — PANTOPRAZOLE SODIUM 40 MG/1
TABLET, DELAYED RELEASE ORAL
Qty: 90 TABLET | Refills: 1 | Status: SHIPPED | OUTPATIENT
Start: 2021-04-15

## 2021-10-11 DIAGNOSIS — K21.9 GASTROESOPHAGEAL REFLUX DISEASE WITHOUT ESOPHAGITIS: ICD-10-CM

## 2021-10-11 RX ORDER — PANTOPRAZOLE SODIUM 40 MG/1
TABLET, DELAYED RELEASE ORAL
Qty: 90 TABLET | Refills: 3 | OUTPATIENT
Start: 2021-10-11

## 2022-03-28 DIAGNOSIS — F39 MOOD DISORDER: ICD-10-CM

## 2022-03-28 RX ORDER — QUETIAPINE FUMARATE 100 MG/1
TABLET, FILM COATED ORAL
Qty: 30 TABLET | Refills: 0 | Status: SHIPPED | OUTPATIENT
Start: 2022-03-28

## 2023-01-01 ENCOUNTER — APPOINTMENT (OUTPATIENT)
Dept: CT IMAGING | Age: 73
DRG: 689 | End: 2023-01-01
Attending: INTERNAL MEDICINE
Payer: MEDICARE

## 2023-01-01 ENCOUNTER — HOSPITAL ENCOUNTER (INPATIENT)
Age: 73
LOS: 3 days | Discharge: HOSPICE | DRG: 689 | End: 2023-04-15
Attending: INTERNAL MEDICINE | Admitting: INTERNAL MEDICINE
Payer: MEDICARE

## 2023-01-01 ENCOUNTER — HOSPITAL ENCOUNTER (EMERGENCY)
Age: 73
Discharge: NURSING, REHAB OR LONG-TERM CARE | DRG: 689 | End: 2023-04-10
Attending: PEDIATRICS
Payer: MEDICARE

## 2023-01-01 ENCOUNTER — APPOINTMENT (OUTPATIENT)
Dept: CT IMAGING | Age: 73
DRG: 689 | End: 2023-01-01
Payer: MEDICARE

## 2023-01-01 ENCOUNTER — APPOINTMENT (OUTPATIENT)
Dept: INTERVENTIONAL RADIOLOGY/VASCULAR | Age: 73
DRG: 689 | End: 2023-01-01
Attending: INTERNAL MEDICINE
Payer: MEDICARE

## 2023-01-01 VITALS
WEIGHT: 135 LBS | BODY MASS INDEX: 24.84 KG/M2 | DIASTOLIC BLOOD PRESSURE: 87 MMHG | RESPIRATION RATE: 16 BRPM | SYSTOLIC BLOOD PRESSURE: 113 MMHG | TEMPERATURE: 98 F | HEART RATE: 65 BPM | OXYGEN SATURATION: 98 % | HEIGHT: 62 IN

## 2023-01-01 VITALS
SYSTOLIC BLOOD PRESSURE: 133 MMHG | TEMPERATURE: 97 F | OXYGEN SATURATION: 79 % | HEIGHT: 64 IN | DIASTOLIC BLOOD PRESSURE: 80 MMHG | WEIGHT: 124 LBS | BODY MASS INDEX: 21.17 KG/M2 | RESPIRATION RATE: 22 BRPM | HEART RATE: 88 BPM

## 2023-01-01 DIAGNOSIS — S72.111A CLOSED DISPLACED FRACTURE OF GREATER TROCHANTER OF RIGHT FEMUR, INITIAL ENCOUNTER (HCC): ICD-10-CM

## 2023-01-01 DIAGNOSIS — M25.551 RIGHT HIP PAIN: Primary | ICD-10-CM

## 2023-01-01 LAB
25(OH)D3 SERPL-MCNC: 41.3 NG/ML
25(OH)D3 SERPL-MCNC: 47.6 NG/ML
ALBUMIN SERPL-MCNC: 2.8 G/DL (ref 3.5–5.2)
ALBUMIN SERPL-MCNC: 3.1 G/DL (ref 3.5–5.2)
ALBUMIN SERPL-MCNC: 3.3 G/DL (ref 3.5–5.2)
ALP SERPL-CCNC: 102 U/L (ref 35–104)
ALP SERPL-CCNC: 113 U/L (ref 35–104)
ALP SERPL-CCNC: 125 U/L (ref 35–104)
ALT SERPL-CCNC: 16 U/L (ref 5–33)
ALT SERPL-CCNC: 16 U/L (ref 5–33)
ALT SERPL-CCNC: 17 U/L (ref 5–33)
AMMONIA PLAS-SCNC: 25 UMOL/L (ref 11–51)
ANION GAP SERPL CALCULATED.3IONS-SCNC: 11 MMOL/L (ref 7–19)
ANION GAP SERPL CALCULATED.3IONS-SCNC: 13 MMOL/L (ref 7–19)
ANION GAP SERPL CALCULATED.3IONS-SCNC: 14 MMOL/L (ref 7–19)
ANION GAP SERPL CALCULATED.3IONS-SCNC: 15 MMOL/L (ref 7–19)
ANION GAP SERPL CALCULATED.3IONS-SCNC: 15 MMOL/L (ref 7–19)
ANISOCYTOSIS BLD QL SMEAR: ABNORMAL
AST SERPL-CCNC: 18 U/L (ref 5–32)
AST SERPL-CCNC: 20 U/L (ref 5–32)
AST SERPL-CCNC: 20 U/L (ref 5–32)
BACTERIA URNS QL MICRO: NEGATIVE /HPF
BASOPHILS # BLD: 0 K/UL (ref 0–0.2)
BASOPHILS NFR BLD: 0 % (ref 0–1)
BASOPHILS NFR BLD: 0.2 % (ref 0–1)
BASOPHILS NFR BLD: 0.2 % (ref 0–1)
BASOPHILS NFR BLD: 0.5 % (ref 0–1)
BASOPHILS NFR BLD: 0.5 % (ref 0–1)
BILIRUB SERPL-MCNC: 0.5 MG/DL (ref 0.2–1.2)
BILIRUB SERPL-MCNC: 0.9 MG/DL (ref 0.2–1.2)
BILIRUB SERPL-MCNC: 1.3 MG/DL (ref 0.2–1.2)
BILIRUB UR QL STRIP: NEGATIVE
BUN SERPL-MCNC: 20 MG/DL (ref 8–23)
BUN SERPL-MCNC: 29 MG/DL (ref 8–23)
BUN SERPL-MCNC: 36 MG/DL (ref 8–23)
BUN SERPL-MCNC: 37 MG/DL (ref 8–23)
BUN SERPL-MCNC: 39 MG/DL (ref 8–23)
CALCIUM SERPL-MCNC: 8.6 MG/DL (ref 8.8–10.2)
CALCIUM SERPL-MCNC: 9 MG/DL (ref 8.8–10.2)
CALCIUM SERPL-MCNC: 9 MG/DL (ref 8.8–10.2)
CALCIUM SERPL-MCNC: 9.3 MG/DL (ref 8.8–10.2)
CALCIUM SERPL-MCNC: 9.3 MG/DL (ref 8.8–10.2)
CHLORIDE SERPL-SCNC: 108 MMOL/L (ref 98–111)
CHLORIDE SERPL-SCNC: 111 MMOL/L (ref 98–111)
CHLORIDE SERPL-SCNC: 111 MMOL/L (ref 98–111)
CHLORIDE SERPL-SCNC: 118 MMOL/L (ref 98–111)
CHLORIDE SERPL-SCNC: 119 MMOL/L (ref 98–111)
CLARITY UR: CLEAR
CO2 SERPL-SCNC: 21 MMOL/L (ref 22–29)
CO2 SERPL-SCNC: 22 MMOL/L (ref 22–29)
CO2 SERPL-SCNC: 24 MMOL/L (ref 22–29)
CO2 SERPL-SCNC: 24 MMOL/L (ref 22–29)
CO2 SERPL-SCNC: 25 MMOL/L (ref 22–29)
COLOR UR: YELLOW
CREAT SERPL-MCNC: 0.9 MG/DL (ref 0.5–0.9)
CREAT SERPL-MCNC: 1 MG/DL (ref 0.5–0.9)
CREAT SERPL-MCNC: 1.1 MG/DL (ref 0.5–0.9)
CREAT SERPL-MCNC: 1.4 MG/DL (ref 0.5–0.9)
CREAT SERPL-MCNC: 1.4 MG/DL (ref 0.5–0.9)
CRYSTALS URNS MICRO: ABNORMAL /HPF
EOSINOPHIL # BLD: 0 K/UL (ref 0–0.6)
EOSINOPHIL # BLD: 0.1 K/UL (ref 0–0.6)
EOSINOPHIL NFR BLD: 0 % (ref 0–5)
EOSINOPHIL NFR BLD: 0 % (ref 0–5)
EOSINOPHIL NFR BLD: 0.1 % (ref 0–5)
EOSINOPHIL NFR BLD: 0.3 % (ref 0–5)
EOSINOPHIL NFR BLD: 1.1 % (ref 0–5)
EPI CELLS #/AREA URNS AUTO: 6 /HPF (ref 0–5)
ERYTHROCYTE [DISTWIDTH] IN BLOOD BY AUTOMATED COUNT: 15.2 % (ref 11.5–14.5)
ERYTHROCYTE [DISTWIDTH] IN BLOOD BY AUTOMATED COUNT: 15.3 % (ref 11.5–14.5)
ERYTHROCYTE [DISTWIDTH] IN BLOOD BY AUTOMATED COUNT: 15.6 % (ref 11.5–14.5)
GLUCOSE SERPL-MCNC: 117 MG/DL (ref 74–109)
GLUCOSE SERPL-MCNC: 133 MG/DL (ref 74–109)
GLUCOSE SERPL-MCNC: 157 MG/DL (ref 74–109)
GLUCOSE SERPL-MCNC: 88 MG/DL (ref 74–109)
GLUCOSE SERPL-MCNC: 92 MG/DL (ref 74–109)
GLUCOSE UR STRIP.AUTO-MCNC: NEGATIVE MG/DL
HCT VFR BLD AUTO: 36.6 % (ref 37–47)
HCT VFR BLD AUTO: 38.6 % (ref 37–47)
HCT VFR BLD AUTO: 42.7 % (ref 37–47)
HCT VFR BLD AUTO: 42.8 % (ref 37–47)
HCT VFR BLD AUTO: 44.6 % (ref 37–47)
HGB BLD-MCNC: 11.1 G/DL (ref 12–16)
HGB BLD-MCNC: 12 G/DL (ref 12–16)
HGB BLD-MCNC: 13.2 G/DL (ref 12–16)
HGB BLD-MCNC: 13.3 G/DL (ref 12–16)
HGB BLD-MCNC: 14 G/DL (ref 12–16)
HGB UR STRIP.AUTO-MCNC: ABNORMAL MG/L
HYALINE CASTS #/AREA URNS AUTO: 6 /HPF (ref 0–8)
IMM GRANULOCYTES # BLD: 0 K/UL
IMM GRANULOCYTES # BLD: 0.1 K/UL
IMM GRANULOCYTES # BLD: 0.1 K/UL
KETONES UR STRIP.AUTO-MCNC: NEGATIVE MG/DL
LACTATE BLDV-SCNC: 1.9 MMOL/L (ref 0.5–1.9)
LACTATE BLDV-SCNC: 2.4 MMOL/L (ref 0.5–1.9)
LEUKOCYTE ESTERASE UR QL STRIP.AUTO: ABNORMAL
LYMPHOCYTES # BLD: 0.2 K/UL (ref 1.1–4.5)
LYMPHOCYTES # BLD: 0.5 K/UL (ref 1.1–4.5)
LYMPHOCYTES # BLD: 0.7 K/UL (ref 1.1–4.5)
LYMPHOCYTES # BLD: 0.8 K/UL (ref 1.1–4.5)
LYMPHOCYTES # BLD: 1.3 K/UL (ref 1.1–4.5)
LYMPHOCYTES NFR BLD: 13 % (ref 20–40)
LYMPHOCYTES NFR BLD: 13.2 % (ref 20–40)
LYMPHOCYTES NFR BLD: 2.8 % (ref 20–40)
LYMPHOCYTES NFR BLD: 5 % (ref 20–40)
LYMPHOCYTES NFR BLD: 6.5 % (ref 20–40)
MAGNESIUM SERPL-MCNC: 1.9 MG/DL (ref 1.6–2.4)
MAGNESIUM SERPL-MCNC: 2.1 MG/DL (ref 1.6–2.4)
MCH RBC QN AUTO: 28.5 PG (ref 27–31)
MCH RBC QN AUTO: 28.6 PG (ref 27–31)
MCH RBC QN AUTO: 28.8 PG (ref 27–31)
MCH RBC QN AUTO: 29 PG (ref 27–31)
MCH RBC QN AUTO: 29.1 PG (ref 27–31)
MCHC RBC AUTO-ENTMCNC: 30.3 G/DL (ref 33–37)
MCHC RBC AUTO-ENTMCNC: 30.9 G/DL (ref 33–37)
MCHC RBC AUTO-ENTMCNC: 31.1 G/DL (ref 33–37)
MCHC RBC AUTO-ENTMCNC: 31.1 G/DL (ref 33–37)
MCHC RBC AUTO-ENTMCNC: 31.4 G/DL (ref 33–37)
MCV RBC AUTO: 91.8 FL (ref 81–99)
MCV RBC AUTO: 92.7 FL (ref 81–99)
MCV RBC AUTO: 93 FL (ref 81–99)
MCV RBC AUTO: 93.2 FL (ref 81–99)
MCV RBC AUTO: 94.3 FL (ref 81–99)
MONOCYTES # BLD: 0.3 K/UL (ref 0–0.9)
MONOCYTES # BLD: 0.3 K/UL (ref 0–0.9)
MONOCYTES # BLD: 0.5 K/UL (ref 0–0.9)
MONOCYTES # BLD: 0.6 K/UL (ref 0–0.9)
MONOCYTES # BLD: 0.6 K/UL (ref 0–0.9)
MONOCYTES NFR BLD: 3 % (ref 0–10)
MONOCYTES NFR BLD: 4.9 % (ref 0–10)
MONOCYTES NFR BLD: 5.5 % (ref 0–10)
MONOCYTES NFR BLD: 5.6 % (ref 0–10)
MONOCYTES NFR BLD: 9.2 % (ref 0–10)
NEUTROPHILS # BLD: 4.6 K/UL (ref 1.5–7.5)
NEUTROPHILS # BLD: 5.5 K/UL (ref 1.5–7.5)
NEUTROPHILS # BLD: 7.7 K/UL (ref 1.5–7.5)
NEUTROPHILS # BLD: 7.8 K/UL (ref 1.5–7.5)
NEUTROPHILS # BLD: 9.8 K/UL (ref 1.5–7.5)
NEUTS BAND NFR BLD MANUAL: 18 % (ref 0–5)
NEUTS SEG NFR BLD: 73 % (ref 50–65)
NEUTS SEG NFR BLD: 76.6 % (ref 50–65)
NEUTS SEG NFR BLD: 80.2 % (ref 50–65)
NEUTS SEG NFR BLD: 87.1 % (ref 50–65)
NEUTS SEG NFR BLD: 90.9 % (ref 50–65)
NITRITE UR QL STRIP.AUTO: NEGATIVE
PH UR STRIP.AUTO: 8 [PH] (ref 5–8)
PHOSPHATE SERPL-MCNC: 2.2 MG/DL (ref 2.5–4.5)
PHOSPHATE SERPL-MCNC: 2.4 MG/DL (ref 2.5–4.5)
PLATELET # BLD AUTO: 331 K/UL (ref 130–400)
PLATELET # BLD AUTO: 335 K/UL (ref 130–400)
PLATELET # BLD AUTO: 381 K/UL (ref 130–400)
PLATELET # BLD AUTO: 384 K/UL (ref 130–400)
PLATELET # BLD AUTO: 393 K/UL (ref 130–400)
PLATELET SLIDE REVIEW: ADEQUATE
PMV BLD AUTO: 11.2 FL (ref 9.4–12.3)
PMV BLD AUTO: 11.3 FL (ref 9.4–12.3)
PMV BLD AUTO: 11.3 FL (ref 9.4–12.3)
PMV BLD AUTO: 11.4 FL (ref 9.4–12.3)
PMV BLD AUTO: 11.5 FL (ref 9.4–12.3)
POTASSIUM SERPL-SCNC: 3.3 MMOL/L (ref 3.5–5)
POTASSIUM SERPL-SCNC: 3.3 MMOL/L (ref 3.5–5)
POTASSIUM SERPL-SCNC: 3.4 MMOL/L (ref 3.5–5)
POTASSIUM SERPL-SCNC: 3.9 MMOL/L (ref 3.5–5)
POTASSIUM SERPL-SCNC: 4 MMOL/L (ref 3.5–5)
POTASSIUM SERPL-SCNC: 4.2 MMOL/L (ref 3.5–5)
PROT SERPL-MCNC: 6.4 G/DL (ref 6.6–8.7)
PROT SERPL-MCNC: 6.5 G/DL (ref 6.6–8.7)
PROT SERPL-MCNC: 7.6 G/DL (ref 6.6–8.7)
PROT UR STRIP.AUTO-MCNC: 30 MG/DL
PTH-INTACT SERPL-MCNC: 49.1 PG/ML (ref 15–65)
RBC # BLD AUTO: 3.88 M/UL (ref 4.2–5.4)
RBC # BLD AUTO: 4.14 M/UL (ref 4.2–5.4)
RBC # BLD AUTO: 4.59 M/UL (ref 4.2–5.4)
RBC # BLD AUTO: 4.66 M/UL (ref 4.2–5.4)
RBC # BLD AUTO: 4.81 M/UL (ref 4.2–5.4)
RBC #/AREA URNS AUTO: 4 /HPF (ref 0–4)
REASON FOR REJECTION: NORMAL
REASON FOR REJECTION: NORMAL
REJECTED TEST: NORMAL
REJECTED TEST: NORMAL
SARS-COV-2 RDRP RESP QL NAA+PROBE: NOT DETECTED
SODIUM SERPL-SCNC: 143 MMOL/L (ref 136–145)
SODIUM SERPL-SCNC: 147 MMOL/L (ref 136–145)
SODIUM SERPL-SCNC: 147 MMOL/L (ref 136–145)
SODIUM SERPL-SCNC: 152 MMOL/L (ref 136–145)
SODIUM SERPL-SCNC: 155 MMOL/L (ref 136–145)
SODIUM SERPL-SCNC: 155 MMOL/L (ref 136–145)
SODIUM SERPL-SCNC: 157 MMOL/L (ref 136–145)
SP GR UR STRIP.AUTO: 1.01 (ref 1–1.03)
TSH SERPL DL<=0.005 MIU/L-ACNC: 1.07 UIU/ML (ref 0.35–5.5)
UROBILINOGEN UR STRIP.AUTO-MCNC: 1 E.U./DL
VARIANT LYMPHS NFR BLD: 1 % (ref 0–8)
VIT B12 SERPL-MCNC: 1101 PG/ML (ref 211–946)
WBC # BLD AUTO: 11.2 K/UL (ref 4.8–10.8)
WBC # BLD AUTO: 6 K/UL (ref 4.8–10.8)
WBC # BLD AUTO: 6 K/UL (ref 4.8–10.8)
WBC # BLD AUTO: 8.5 K/UL (ref 4.8–10.8)
WBC # BLD AUTO: 9.8 K/UL (ref 4.8–10.8)
WBC #/AREA URNS AUTO: 6 /HPF (ref 0–5)

## 2023-01-01 PROCEDURE — 36573 INSJ PICC RS&I 5 YR+: CPT | Performed by: SURGERY

## 2023-01-01 PROCEDURE — 6360000002 HC RX W HCPCS: Performed by: PSYCHIATRY & NEUROLOGY

## 2023-01-01 PROCEDURE — 82306 VITAMIN D 25 HYDROXY: CPT

## 2023-01-01 PROCEDURE — 82140 ASSAY OF AMMONIA: CPT

## 2023-01-01 PROCEDURE — 2580000003 HC RX 258: Performed by: HOSPITALIST

## 2023-01-01 PROCEDURE — 80048 BASIC METABOLIC PNL TOTAL CA: CPT

## 2023-01-01 PROCEDURE — 2140000000 HC CCU INTERMEDIATE R&B

## 2023-01-01 PROCEDURE — 2000000000 HC ICU R&B

## 2023-01-01 PROCEDURE — 2500000003 HC RX 250 WO HCPCS: Performed by: INTERNAL MEDICINE

## 2023-01-01 PROCEDURE — 84132 ASSAY OF SERUM POTASSIUM: CPT

## 2023-01-01 PROCEDURE — 6360000002 HC RX W HCPCS: Performed by: INTERNAL MEDICINE

## 2023-01-01 PROCEDURE — 94760 N-INVAS EAR/PLS OXIMETRY 1: CPT

## 2023-01-01 PROCEDURE — 70450 CT HEAD/BRAIN W/O DYE: CPT | Performed by: RADIOLOGY

## 2023-01-01 PROCEDURE — 80053 COMPREHEN METABOLIC PANEL: CPT

## 2023-01-01 PROCEDURE — 2580000003 HC RX 258: Performed by: INTERNAL MEDICINE

## 2023-01-01 PROCEDURE — 36415 COLL VENOUS BLD VENIPUNCTURE: CPT

## 2023-01-01 PROCEDURE — 99223 1ST HOSP IP/OBS HIGH 75: CPT | Performed by: PSYCHIATRY & NEUROLOGY

## 2023-01-01 PROCEDURE — 83970 ASSAY OF PARATHORMONE: CPT

## 2023-01-01 PROCEDURE — 85025 COMPLETE CBC W/AUTO DIFF WBC: CPT

## 2023-01-01 PROCEDURE — 02HV33Z INSERTION OF INFUSION DEVICE INTO SUPERIOR VENA CAVA, PERCUTANEOUS APPROACH: ICD-10-PCS | Performed by: SURGERY

## 2023-01-01 PROCEDURE — 6360000002 HC RX W HCPCS

## 2023-01-01 PROCEDURE — 84443 ASSAY THYROID STIM HORMONE: CPT

## 2023-01-01 PROCEDURE — C1769 GUIDE WIRE: HCPCS

## 2023-01-01 PROCEDURE — 70450 CT HEAD/BRAIN W/O DYE: CPT

## 2023-01-01 PROCEDURE — 83735 ASSAY OF MAGNESIUM: CPT

## 2023-01-01 PROCEDURE — 99233 SBSQ HOSP IP/OBS HIGH 50: CPT | Performed by: PHYSICIAN ASSISTANT

## 2023-01-01 PROCEDURE — 84295 ASSAY OF SERUM SODIUM: CPT

## 2023-01-01 PROCEDURE — 99284 EMERGENCY DEPT VISIT MOD MDM: CPT | Performed by: PEDIATRICS

## 2023-01-01 PROCEDURE — 73700 CT LOWER EXTREMITY W/O DYE: CPT

## 2023-01-01 PROCEDURE — 83605 ASSAY OF LACTIC ACID: CPT

## 2023-01-01 PROCEDURE — 6360000002 HC RX W HCPCS: Performed by: PEDIATRICS

## 2023-01-01 PROCEDURE — 2580000003 HC RX 258

## 2023-01-01 PROCEDURE — 84100 ASSAY OF PHOSPHORUS: CPT

## 2023-01-01 PROCEDURE — 99223 1ST HOSP IP/OBS HIGH 75: CPT

## 2023-01-01 PROCEDURE — 6360000002 HC RX W HCPCS: Performed by: HOSPITALIST

## 2023-01-01 PROCEDURE — 1210000000 HC MED SURG R&B

## 2023-01-01 PROCEDURE — 36573 INSJ PICC RS&I 5 YR+: CPT

## 2023-01-01 PROCEDURE — 2500000003 HC RX 250 WO HCPCS: Performed by: HOSPITALIST

## 2023-01-01 PROCEDURE — 96374 THER/PROPH/DIAG INJ IV PUSH: CPT

## 2023-01-01 PROCEDURE — 6370000000 HC RX 637 (ALT 250 FOR IP)

## 2023-01-01 PROCEDURE — 87040 BLOOD CULTURE FOR BACTERIA: CPT

## 2023-01-01 PROCEDURE — 82607 VITAMIN B-12: CPT

## 2023-01-01 PROCEDURE — 96375 TX/PRO/DX INJ NEW DRUG ADDON: CPT

## 2023-01-01 PROCEDURE — 2500000003 HC RX 250 WO HCPCS

## 2023-01-01 PROCEDURE — 36600 WITHDRAWAL OF ARTERIAL BLOOD: CPT

## 2023-01-01 PROCEDURE — 81001 URINALYSIS AUTO W/SCOPE: CPT

## 2023-01-01 PROCEDURE — 87635 SARS-COV-2 COVID-19 AMP PRB: CPT

## 2023-01-01 RX ORDER — SODIUM CHLORIDE 0.9 % (FLUSH) 0.9 %
5-40 SYRINGE (ML) INJECTION PRN
Status: DISCONTINUED | OUTPATIENT
Start: 2023-01-01 | End: 2023-01-01 | Stop reason: HOSPADM

## 2023-01-01 RX ORDER — HALOPERIDOL 5 MG/ML
5 INJECTION INTRAMUSCULAR EVERY 8 HOURS
Status: COMPLETED | OUTPATIENT
Start: 2023-01-01 | End: 2023-01-01

## 2023-01-01 RX ORDER — LORAZEPAM 2 MG/ML
0.5 INJECTION INTRAMUSCULAR EVERY 4 HOURS PRN
Status: DISCONTINUED | OUTPATIENT
Start: 2023-01-01 | End: 2023-01-01

## 2023-01-01 RX ORDER — THIAMINE HYDROCHLORIDE 100 MG/ML
100 INJECTION, SOLUTION INTRAMUSCULAR; INTRAVENOUS DAILY
Status: DISCONTINUED | OUTPATIENT
Start: 2023-01-01 | End: 2023-01-01 | Stop reason: HOSPADM

## 2023-01-01 RX ORDER — DEXTROSE MONOHYDRATE 50 MG/ML
INJECTION, SOLUTION INTRAVENOUS CONTINUOUS
Status: DISCONTINUED | OUTPATIENT
Start: 2023-01-01 | End: 2023-01-01 | Stop reason: HOSPADM

## 2023-01-01 RX ORDER — LORAZEPAM 2 MG/ML
0.5 INJECTION INTRAMUSCULAR
Status: DISCONTINUED | OUTPATIENT
Start: 2023-01-01 | End: 2023-01-01 | Stop reason: HOSPADM

## 2023-01-01 RX ORDER — MECOBALAMIN 5000 MCG
5 TABLET,DISINTEGRATING ORAL NIGHTLY PRN
Status: DISCONTINUED | OUTPATIENT
Start: 2023-01-01 | End: 2023-01-01

## 2023-01-01 RX ORDER — POTASSIUM CHLORIDE 7.45 MG/ML
10 INJECTION INTRAVENOUS PRN
Status: DISCONTINUED | OUTPATIENT
Start: 2023-01-01 | End: 2023-01-01 | Stop reason: HOSPADM

## 2023-01-01 RX ORDER — SODIUM CHLORIDE 9 MG/ML
25 INJECTION, SOLUTION INTRAVENOUS PRN
Status: DISCONTINUED | OUTPATIENT
Start: 2023-01-01 | End: 2023-01-01 | Stop reason: HOSPADM

## 2023-01-01 RX ORDER — ONDANSETRON 2 MG/ML
4 INJECTION INTRAMUSCULAR; INTRAVENOUS EVERY 6 HOURS PRN
Status: DISCONTINUED | OUTPATIENT
Start: 2023-01-01 | End: 2023-01-01 | Stop reason: HOSPADM

## 2023-01-01 RX ORDER — MECOBALAMIN 5000 MCG
5 TABLET,DISINTEGRATING ORAL NIGHTLY
Status: DISCONTINUED | OUTPATIENT
Start: 2023-01-01 | End: 2023-01-01

## 2023-01-01 RX ORDER — MAGNESIUM SULFATE IN WATER 40 MG/ML
2000 INJECTION, SOLUTION INTRAVENOUS PRN
Status: DISCONTINUED | OUTPATIENT
Start: 2023-01-01 | End: 2023-01-01 | Stop reason: HOSPADM

## 2023-01-01 RX ORDER — NALOXONE HYDROCHLORIDE 0.4 MG/ML
0.4 INJECTION, SOLUTION INTRAMUSCULAR; INTRAVENOUS; SUBCUTANEOUS PRN
Status: DISCONTINUED | OUTPATIENT
Start: 2023-01-01 | End: 2023-01-01

## 2023-01-01 RX ORDER — RISPERIDONE 1 MG/1
1 TABLET, ORALLY DISINTEGRATING ORAL 2 TIMES DAILY
Status: DISCONTINUED | OUTPATIENT
Start: 2023-01-01 | End: 2023-01-01

## 2023-01-01 RX ORDER — SODIUM CHLORIDE 0.9 % (FLUSH) 0.9 %
5-40 SYRINGE (ML) INJECTION PRN
Status: DISCONTINUED | OUTPATIENT
Start: 2023-01-01 | End: 2023-01-01 | Stop reason: SDUPTHER

## 2023-01-01 RX ORDER — PHENAZOPYRIDINE HYDROCHLORIDE 200 MG/1
200 TABLET, FILM COATED ORAL
Status: DISCONTINUED | OUTPATIENT
Start: 2023-01-01 | End: 2023-01-01

## 2023-01-01 RX ORDER — OXYCODONE HYDROCHLORIDE AND ACETAMINOPHEN 5; 325 MG/1; MG/1
1 TABLET ORAL EVERY 8 HOURS
Qty: 9 TABLET | Refills: 0 | Status: SHIPPED | OUTPATIENT
Start: 2023-01-01 | End: 2023-01-01

## 2023-01-01 RX ORDER — PROMETHAZINE HYDROCHLORIDE 12.5 MG/1
12.5 TABLET ORAL EVERY 6 HOURS PRN
Status: DISCONTINUED | OUTPATIENT
Start: 2023-01-01 | End: 2023-01-01

## 2023-01-01 RX ORDER — HALOPERIDOL 5 MG/ML
5 INJECTION INTRAMUSCULAR EVERY 4 HOURS PRN
Status: DISCONTINUED | OUTPATIENT
Start: 2023-01-01 | End: 2023-01-01

## 2023-01-01 RX ORDER — TRAZODONE HYDROCHLORIDE 50 MG/1
50 TABLET ORAL NIGHTLY
Status: DISCONTINUED | OUTPATIENT
Start: 2023-01-01 | End: 2023-01-01

## 2023-01-01 RX ORDER — ACETAMINOPHEN 650 MG/1
650 SUPPOSITORY RECTAL EVERY 6 HOURS PRN
Status: DISCONTINUED | OUTPATIENT
Start: 2023-01-01 | End: 2023-01-01 | Stop reason: HOSPADM

## 2023-01-01 RX ORDER — HYDROXYZINE HYDROCHLORIDE 25 MG/ML
25 INJECTION, SOLUTION INTRAMUSCULAR EVERY 6 HOURS PRN
Status: DISCONTINUED | OUTPATIENT
Start: 2023-01-01 | End: 2023-01-01

## 2023-01-01 RX ORDER — DEXTROSE, SODIUM CHLORIDE, AND POTASSIUM CHLORIDE 5; .45; .15 G/100ML; G/100ML; G/100ML
INJECTION INTRAVENOUS CONTINUOUS
Status: DISCONTINUED | OUTPATIENT
Start: 2023-01-01 | End: 2023-01-01 | Stop reason: HOSPADM

## 2023-01-01 RX ORDER — ASPIRIN 81 MG/1
81 TABLET, CHEWABLE ORAL DAILY
Status: DISCONTINUED | OUTPATIENT
Start: 2023-01-01 | End: 2023-01-01 | Stop reason: HOSPADM

## 2023-01-01 RX ORDER — HYDROMORPHONE HYDROCHLORIDE 1 MG/ML
0.5 INJECTION, SOLUTION INTRAMUSCULAR; INTRAVENOUS; SUBCUTANEOUS ONCE
Status: COMPLETED | OUTPATIENT
Start: 2023-01-01 | End: 2023-01-01

## 2023-01-01 RX ORDER — SODIUM CHLORIDE 0.9 % (FLUSH) 0.9 %
5-40 SYRINGE (ML) INJECTION EVERY 12 HOURS SCHEDULED
Status: DISCONTINUED | OUTPATIENT
Start: 2023-01-01 | End: 2023-01-01 | Stop reason: SDUPTHER

## 2023-01-01 RX ORDER — SODIUM CHLORIDE 0.9 % (FLUSH) 0.9 %
5-40 SYRINGE (ML) INJECTION EVERY 12 HOURS SCHEDULED
Status: DISCONTINUED | OUTPATIENT
Start: 2023-01-01 | End: 2023-01-01 | Stop reason: HOSPADM

## 2023-01-01 RX ORDER — DEXMEDETOMIDINE HYDROCHLORIDE 4 UG/ML
.1-1.5 INJECTION, SOLUTION INTRAVENOUS CONTINUOUS
Status: DISCONTINUED | OUTPATIENT
Start: 2023-01-01 | End: 2023-01-01 | Stop reason: HOSPADM

## 2023-01-01 RX ORDER — BENZTROPINE MESYLATE 1 MG/1
1 TABLET ORAL 2 TIMES DAILY
Status: DISCONTINUED | OUTPATIENT
Start: 2023-01-01 | End: 2023-01-01 | Stop reason: HOSPADM

## 2023-01-01 RX ORDER — OXYCODONE HYDROCHLORIDE AND ACETAMINOPHEN 5; 325 MG/1; MG/1
1 TABLET ORAL EVERY 8 HOURS
Qty: 9 TABLET | Refills: 0 | Status: SHIPPED | OUTPATIENT
Start: 2023-01-01 | End: 2023-01-01 | Stop reason: SDUPTHER

## 2023-01-01 RX ORDER — FENTANYL CITRATE 50 UG/ML
25 INJECTION, SOLUTION INTRAMUSCULAR; INTRAVENOUS
Status: DISCONTINUED | OUTPATIENT
Start: 2023-01-01 | End: 2023-01-01

## 2023-01-01 RX ORDER — FENTANYL CITRATE 50 UG/ML
50 INJECTION, SOLUTION INTRAMUSCULAR; INTRAVENOUS
Status: DISCONTINUED | OUTPATIENT
Start: 2023-01-01 | End: 2023-01-01

## 2023-01-01 RX ORDER — ACETAMINOPHEN 325 MG/1
650 TABLET ORAL EVERY 6 HOURS PRN
Status: DISCONTINUED | OUTPATIENT
Start: 2023-01-01 | End: 2023-01-01 | Stop reason: HOSPADM

## 2023-01-01 RX ORDER — LIDOCAINE HYDROCHLORIDE 10 MG/ML
5 INJECTION, SOLUTION EPIDURAL; INFILTRATION; INTRACAUDAL; PERINEURAL ONCE
Status: DISCONTINUED | OUTPATIENT
Start: 2023-01-01 | End: 2023-01-01 | Stop reason: HOSPADM

## 2023-01-01 RX ORDER — SODIUM CHLORIDE 9 MG/ML
25 INJECTION, SOLUTION INTRAVENOUS PRN
Status: DISCONTINUED | OUTPATIENT
Start: 2023-01-01 | End: 2023-01-01 | Stop reason: SDUPTHER

## 2023-01-01 RX ORDER — HALOPERIDOL 5 MG/ML
1 INJECTION INTRAMUSCULAR EVERY 6 HOURS PRN
Status: DISCONTINUED | OUTPATIENT
Start: 2023-01-01 | End: 2023-01-01

## 2023-01-01 RX ORDER — MORPHINE SULFATE/PF 50 MG/50ML
.5-1 PATIENT CONTROLLED ANALGESIA SYRINGE INTRAVENOUS CONTINUOUS
Status: DISCONTINUED | OUTPATIENT
Start: 2023-01-01 | End: 2023-01-01 | Stop reason: HOSPADM

## 2023-01-01 RX ORDER — QUETIAPINE FUMARATE 25 MG/1
25 TABLET, FILM COATED ORAL 2 TIMES DAILY
Status: DISCONTINUED | OUTPATIENT
Start: 2023-01-01 | End: 2023-01-01

## 2023-01-01 RX ORDER — SODIUM CHLORIDE, SODIUM LACTATE, POTASSIUM CHLORIDE, CALCIUM CHLORIDE 600; 310; 30; 20 MG/100ML; MG/100ML; MG/100ML; MG/100ML
INJECTION, SOLUTION INTRAVENOUS CONTINUOUS
Status: DISCONTINUED | OUTPATIENT
Start: 2023-01-01 | End: 2023-01-01

## 2023-01-01 RX ORDER — HYDROMORPHONE HYDROCHLORIDE 1 MG/ML
0.5 INJECTION, SOLUTION INTRAMUSCULAR; INTRAVENOUS; SUBCUTANEOUS
Status: DISCONTINUED | OUTPATIENT
Start: 2023-01-01 | End: 2023-01-01

## 2023-01-01 RX ORDER — POLYETHYLENE GLYCOL 3350 17 G/17G
17 POWDER, FOR SOLUTION ORAL DAILY PRN
Status: DISCONTINUED | OUTPATIENT
Start: 2023-01-01 | End: 2023-01-01 | Stop reason: HOSPADM

## 2023-01-01 RX ORDER — OXYCODONE HYDROCHLORIDE AND ACETAMINOPHEN 5; 325 MG/1; MG/1
1 TABLET ORAL EVERY 8 HOURS
Status: DISCONTINUED | OUTPATIENT
Start: 2023-01-01 | End: 2023-01-01 | Stop reason: HOSPADM

## 2023-01-01 RX ORDER — GLYCOPYRROLATE 0.2 MG/ML
0.2 INJECTION INTRAMUSCULAR; INTRAVENOUS EVERY 6 HOURS PRN
Status: DISCONTINUED | OUTPATIENT
Start: 2023-01-01 | End: 2023-01-01 | Stop reason: HOSPADM

## 2023-01-01 RX ORDER — HYDROMORPHONE HYDROCHLORIDE 1 MG/ML
0.25 INJECTION, SOLUTION INTRAMUSCULAR; INTRAVENOUS; SUBCUTANEOUS
Status: DISCONTINUED | OUTPATIENT
Start: 2023-01-01 | End: 2023-01-01

## 2023-01-01 RX ORDER — CALCIUM CARBONATE 200(500)MG
500 TABLET,CHEWABLE ORAL 3 TIMES DAILY PRN
Status: DISCONTINUED | OUTPATIENT
Start: 2023-01-01 | End: 2023-01-01 | Stop reason: HOSPADM

## 2023-01-01 RX ORDER — CLOTRIMAZOLE 1 %
CREAM (GRAM) TOPICAL 2 TIMES DAILY
Status: DISCONTINUED | OUTPATIENT
Start: 2023-01-01 | End: 2023-01-01

## 2023-01-01 RX ORDER — LORAZEPAM 2 MG/ML
1 INJECTION INTRAMUSCULAR ONCE
Status: COMPLETED | OUTPATIENT
Start: 2023-01-01 | End: 2023-01-01

## 2023-01-01 RX ORDER — VITAMIN B COMPLEX
2000 TABLET ORAL DAILY
Status: DISCONTINUED | OUTPATIENT
Start: 2023-01-01 | End: 2023-01-01 | Stop reason: HOSPADM

## 2023-01-01 RX ORDER — SODIUM CHLORIDE 9 MG/ML
INJECTION, SOLUTION INTRAVENOUS PRN
Status: DISCONTINUED | OUTPATIENT
Start: 2023-01-01 | End: 2023-01-01 | Stop reason: HOSPADM

## 2023-01-01 RX ORDER — PHENYTOIN SODIUM 50 MG/ML
100 INJECTION, SOLUTION INTRAMUSCULAR; INTRAVENOUS EVERY 8 HOURS
Status: DISCONTINUED | OUTPATIENT
Start: 2023-01-01 | End: 2023-01-01 | Stop reason: HOSPADM

## 2023-01-01 RX ADMIN — DEXMEDETOMIDINE HYDROCHLORIDE 1.5 MCG/KG/HR: 4 INJECTION, SOLUTION INTRAVENOUS at 13:09

## 2023-01-01 RX ADMIN — HYDROMORPHONE HYDROCHLORIDE 0.5 MG: 1 INJECTION, SOLUTION INTRAMUSCULAR; INTRAVENOUS; SUBCUTANEOUS at 21:13

## 2023-01-01 RX ADMIN — MICONAZOLE NITRATE: 2 POWDER TOPICAL at 08:18

## 2023-01-01 RX ADMIN — SODIUM CHLORIDE, POTASSIUM CHLORIDE, SODIUM LACTATE AND CALCIUM CHLORIDE: 600; 310; 30; 20 INJECTION, SOLUTION INTRAVENOUS at 18:24

## 2023-01-01 RX ADMIN — GLYCOPYRROLATE 0.2 MG: 0.2 INJECTION INTRAMUSCULAR; INTRAVENOUS at 05:24

## 2023-01-01 RX ADMIN — POTASSIUM CHLORIDE 10 MEQ: 7.46 INJECTION, SOLUTION INTRAVENOUS at 03:54

## 2023-01-01 RX ADMIN — HYDROMORPHONE HYDROCHLORIDE 0.5 MG: 1 INJECTION, SOLUTION INTRAMUSCULAR; INTRAVENOUS; SUBCUTANEOUS at 14:24

## 2023-01-01 RX ADMIN — HALOPERIDOL LACTATE 5 MG: 5 INJECTION, SOLUTION INTRAMUSCULAR at 11:00

## 2023-01-01 RX ADMIN — CEFTRIAXONE 1000 MG: 1 INJECTION, POWDER, FOR SOLUTION INTRAMUSCULAR; INTRAVENOUS at 18:21

## 2023-01-01 RX ADMIN — FENTANYL CITRATE 25 MCG: 50 INJECTION, SOLUTION INTRAMUSCULAR; INTRAVENOUS at 14:37

## 2023-01-01 RX ADMIN — FENTANYL CITRATE 50 MCG: 50 INJECTION, SOLUTION INTRAMUSCULAR; INTRAVENOUS at 07:39

## 2023-01-01 RX ADMIN — DEXMEDETOMIDINE HYDROCHLORIDE 0.4 MCG/KG/HR: 4 INJECTION, SOLUTION INTRAVENOUS at 08:15

## 2023-01-01 RX ADMIN — DEXMEDETOMIDINE HYDROCHLORIDE 0.4 MCG/KG/HR: 4 INJECTION, SOLUTION INTRAVENOUS at 20:01

## 2023-01-01 RX ADMIN — DEXTROSE MONOHYDRATE: 50 INJECTION, SOLUTION INTRAVENOUS at 03:53

## 2023-01-01 RX ADMIN — PHENYTOIN SODIUM 845 MG: 50 INJECTION INTRAMUSCULAR; INTRAVENOUS at 20:05

## 2023-01-01 RX ADMIN — SODIUM CHLORIDE, PRESERVATIVE FREE 10 ML: 5 INJECTION INTRAVENOUS at 20:18

## 2023-01-01 RX ADMIN — LORAZEPAM 0.5 MG: 2 INJECTION INTRAMUSCULAR; INTRAVENOUS at 05:22

## 2023-01-01 RX ADMIN — FENTANYL CITRATE 50 MCG: 50 INJECTION, SOLUTION INTRAMUSCULAR; INTRAVENOUS at 04:05

## 2023-01-01 RX ADMIN — CEFTRIAXONE 1000 MG: 1 INJECTION, POWDER, FOR SOLUTION INTRAMUSCULAR; INTRAVENOUS at 18:06

## 2023-01-01 RX ADMIN — LORAZEPAM 1 MG: 2 INJECTION INTRAMUSCULAR; INTRAVENOUS at 14:24

## 2023-01-01 RX ADMIN — HALOPERIDOL LACTATE 5 MG: 5 INJECTION, SOLUTION INTRAMUSCULAR at 18:06

## 2023-01-01 RX ADMIN — FENTANYL CITRATE 25 MCG: 50 INJECTION, SOLUTION INTRAMUSCULAR; INTRAVENOUS at 06:46

## 2023-01-01 RX ADMIN — MICONAZOLE NITRATE: 2 POWDER TOPICAL at 09:00

## 2023-01-01 RX ADMIN — CEFTRIAXONE 1000 MG: 1 INJECTION, POWDER, FOR SOLUTION INTRAMUSCULAR; INTRAVENOUS at 18:28

## 2023-01-01 RX ADMIN — POTASSIUM CHLORIDE 10 MEQ: 7.46 INJECTION, SOLUTION INTRAVENOUS at 09:35

## 2023-01-01 RX ADMIN — DEXTROSE MONOHYDRATE: 50 INJECTION, SOLUTION INTRAVENOUS at 03:25

## 2023-01-01 RX ADMIN — MICONAZOLE NITRATE 1 APPLICATOR: 20 CREAM VAGINAL at 20:43

## 2023-01-01 RX ADMIN — DEXMEDETOMIDINE HYDROCHLORIDE 0.6 MCG/KG/HR: 4 INJECTION, SOLUTION INTRAVENOUS at 13:30

## 2023-01-01 RX ADMIN — Medication 2 MG/HR: at 05:59

## 2023-01-01 RX ADMIN — HALOPERIDOL LACTATE 5 MG: 5 INJECTION, SOLUTION INTRAMUSCULAR at 00:43

## 2023-01-01 RX ADMIN — DEXTROSE MONOHYDRATE: 50 INJECTION, SOLUTION INTRAVENOUS at 19:05

## 2023-01-01 RX ADMIN — SODIUM CHLORIDE, PRESERVATIVE FREE 10 ML: 5 INJECTION INTRAVENOUS at 08:18

## 2023-01-01 RX ADMIN — ACETAMINOPHEN 650 MG: 650 SUPPOSITORY RECTAL at 20:11

## 2023-01-01 RX ADMIN — LORAZEPAM 0.5 MG: 2 INJECTION INTRAMUSCULAR; INTRAVENOUS at 04:21

## 2023-01-01 RX ADMIN — THIAMINE HYDROCHLORIDE 100 MG: 100 INJECTION, SOLUTION INTRAMUSCULAR; INTRAVENOUS at 13:46

## 2023-01-01 RX ADMIN — FENTANYL CITRATE 50 MCG: 50 INJECTION, SOLUTION INTRAMUSCULAR; INTRAVENOUS at 02:06

## 2023-01-01 RX ADMIN — FENTANYL CITRATE 25 MCG: 50 INJECTION, SOLUTION INTRAMUSCULAR; INTRAVENOUS at 23:22

## 2023-01-01 RX ADMIN — MICONAZOLE NITRATE: 2 POWDER TOPICAL at 20:17

## 2023-01-01 RX ADMIN — KETAMINE HYDROCHLORIDE 1 MG/KG/HR: 100 INJECTION INTRAMUSCULAR; INTRAVENOUS at 08:14

## 2023-01-01 RX ADMIN — POTASSIUM CHLORIDE 10 MEQ: 7.46 INJECTION, SOLUTION INTRAVENOUS at 05:34

## 2023-01-01 RX ADMIN — HYDROXYZINE HYDROCHLORIDE 25 MG: 25 INJECTION, SOLUTION INTRAMUSCULAR at 21:14

## 2023-01-01 RX ADMIN — SODIUM CHLORIDE, PRESERVATIVE FREE 10 ML: 5 INJECTION INTRAVENOUS at 20:09

## 2023-01-01 RX ADMIN — LORAZEPAM 0.5 MG: 2 INJECTION INTRAMUSCULAR; INTRAVENOUS at 08:18

## 2023-01-01 RX ADMIN — HYDROMORPHONE HYDROCHLORIDE 0.5 MG: 1 INJECTION, SOLUTION INTRAMUSCULAR; INTRAVENOUS; SUBCUTANEOUS at 01:50

## 2023-01-01 RX ADMIN — FENTANYL CITRATE 25 MCG: 50 INJECTION, SOLUTION INTRAMUSCULAR; INTRAVENOUS at 20:37

## 2023-01-01 RX ADMIN — KETAMINE HYDROCHLORIDE 1 MG/KG/HR: 100 INJECTION INTRAMUSCULAR; INTRAVENOUS at 03:26

## 2023-01-01 RX ADMIN — POTASSIUM CHLORIDE 10 MEQ: 7.46 INJECTION, SOLUTION INTRAVENOUS at 13:00

## 2023-01-01 RX ADMIN — SODIUM CHLORIDE, PRESERVATIVE FREE 10 ML: 5 INJECTION INTRAVENOUS at 20:46

## 2023-01-01 RX ADMIN — FENTANYL CITRATE 50 MCG: 50 INJECTION, SOLUTION INTRAMUSCULAR; INTRAVENOUS at 05:45

## 2023-01-01 RX ADMIN — MICONAZOLE NITRATE 1 APPLICATOR: 20 CREAM VAGINAL at 20:17

## 2023-01-01 RX ADMIN — GLYCOPYRROLATE 0.2 MG: 0.2 INJECTION INTRAMUSCULAR; INTRAVENOUS at 08:27

## 2023-01-01 RX ADMIN — MICONAZOLE NITRATE: 2 POWDER TOPICAL at 20:41

## 2023-01-01 RX ADMIN — POTASSIUM CHLORIDE 10 MEQ: 7.46 INJECTION, SOLUTION INTRAVENOUS at 04:18

## 2023-01-01 RX ADMIN — HYDROXYZINE HYDROCHLORIDE 25 MG: 25 INJECTION, SOLUTION INTRAMUSCULAR at 04:18

## 2023-01-01 RX ADMIN — KETAMINE HYDROCHLORIDE 1 MG/KG/HR: 100 INJECTION INTRAMUSCULAR; INTRAVENOUS at 19:48

## 2023-01-01 ASSESSMENT — PAIN SCALES - PAIN ASSESSMENT IN ADVANCED DEMENTIA (PAINAD)
BODYLANGUAGE: 1
TOTALSCORE: 6
BREATHING: 2
FACIALEXPRESSION: 2
NEGVOCALIZATION: 1
CONSOLABILITY: 0

## 2023-01-01 ASSESSMENT — PAIN DESCRIPTION - LOCATION
LOCATION: HIP

## 2023-01-01 ASSESSMENT — PAIN - FUNCTIONAL ASSESSMENT
PAIN_FUNCTIONAL_ASSESSMENT: ACTIVITIES ARE NOT PREVENTED
PAIN_FUNCTIONAL_ASSESSMENT: PREVENTS OR INTERFERES SOME ACTIVE ACTIVITIES AND ADLS
PAIN_FUNCTIONAL_ASSESSMENT: ACTIVITIES ARE NOT PREVENTED
PAIN_FUNCTIONAL_ASSESSMENT: ACTIVITIES ARE NOT PREVENTED
PAIN_FUNCTIONAL_ASSESSMENT: PREVENTS OR INTERFERES WITH MANY ACTIVE NOT PASSIVE ACTIVITIES
PAIN_FUNCTIONAL_ASSESSMENT: ACTIVITIES ARE NOT PREVENTED

## 2023-01-01 ASSESSMENT — PAIN DESCRIPTION - ONSET: ONSET: ON-GOING

## 2023-01-01 ASSESSMENT — PAIN SCALES - GENERAL
PAINLEVEL_OUTOF10: 0
PAINLEVEL_OUTOF10: 0
PAINLEVEL_OUTOF10: 8
PAINLEVEL_OUTOF10: 8
PAINLEVEL_OUTOF10: 5
PAINLEVEL_OUTOF10: 2
PAINLEVEL_OUTOF10: 8
PAINLEVEL_OUTOF10: 1

## 2023-01-01 ASSESSMENT — PAIN DESCRIPTION - PAIN TYPE: TYPE: ACUTE PAIN

## 2023-01-01 ASSESSMENT — PAIN DESCRIPTION - DESCRIPTORS
DESCRIPTORS: DISCOMFORT
DESCRIPTORS: OTHER (COMMENT)
DESCRIPTORS: DISCOMFORT

## 2023-01-01 ASSESSMENT — PAIN SCALES - WONG BAKER
WONGBAKER_NUMERICALRESPONSE: 0
WONGBAKER_NUMERICALRESPONSE: 2
WONGBAKER_NUMERICALRESPONSE: 2
WONGBAKER_NUMERICALRESPONSE: 8
WONGBAKER_NUMERICALRESPONSE: 2
WONGBAKER_NUMERICALRESPONSE: 0

## 2023-01-01 ASSESSMENT — PAIN DESCRIPTION - ORIENTATION
ORIENTATION: RIGHT

## 2023-01-01 ASSESSMENT — PAIN DESCRIPTION - FREQUENCY: FREQUENCY: CONTINUOUS

## 2023-03-14 ENCOUNTER — APPOINTMENT (OUTPATIENT)
Dept: CT IMAGING | Age: 73
End: 2023-03-14
Payer: MEDICARE

## 2023-03-14 ENCOUNTER — HOSPITAL ENCOUNTER (EMERGENCY)
Age: 73
Discharge: ANOTHER HOSPITAL | End: 2023-03-15
Attending: EMERGENCY MEDICINE
Payer: MEDICARE

## 2023-03-14 ENCOUNTER — APPOINTMENT (OUTPATIENT)
Dept: GENERAL RADIOLOGY | Age: 73
End: 2023-03-14
Payer: MEDICARE

## 2023-03-14 DIAGNOSIS — R41.82 ALTERED MENTAL STATUS, UNSPECIFIED ALTERED MENTAL STATUS TYPE: Primary | ICD-10-CM

## 2023-03-14 DIAGNOSIS — E87.6 HYPOKALEMIA: ICD-10-CM

## 2023-03-14 DIAGNOSIS — R79.89 ELEVATED BRAIN NATRIURETIC PEPTIDE (BNP) LEVEL: ICD-10-CM

## 2023-03-14 DIAGNOSIS — S22.019A CLOSED FRACTURE OF FIRST THORACIC VERTEBRA, UNSPECIFIED FRACTURE MORPHOLOGY, INITIAL ENCOUNTER (HCC): ICD-10-CM

## 2023-03-14 DIAGNOSIS — R93.89 ABNORMAL CT SCAN: ICD-10-CM

## 2023-03-14 DIAGNOSIS — N17.9 AKI (ACUTE KIDNEY INJURY) (HCC): ICD-10-CM

## 2023-03-14 DIAGNOSIS — I63.9 CEREBROVASCULAR ACCIDENT (CVA), UNSPECIFIED MECHANISM (HCC): ICD-10-CM

## 2023-03-14 DIAGNOSIS — N39.0 URINARY TRACT INFECTION WITHOUT HEMATURIA, SITE UNSPECIFIED: ICD-10-CM

## 2023-03-14 DIAGNOSIS — R77.8 ELEVATED TROPONIN: ICD-10-CM

## 2023-03-14 LAB
ALBUMIN SERPL-MCNC: 3.7 G/DL (ref 3.5–5.2)
ALP SERPL-CCNC: 118 U/L (ref 35–104)
ALT SERPL-CCNC: 25 U/L (ref 5–33)
ANION GAP SERPL CALCULATED.3IONS-SCNC: 12 MMOL/L (ref 7–19)
AST SERPL-CCNC: 64 U/L (ref 5–32)
BACTERIA URNS QL MICRO: ABNORMAL /HPF
BASOPHILS # BLD: 0 K/UL (ref 0–0.2)
BASOPHILS NFR BLD: 0.1 % (ref 0–1)
BILIRUB SERPL-MCNC: 0.7 MG/DL (ref 0.2–1.2)
BILIRUB UR QL STRIP: NEGATIVE
BUN SERPL-MCNC: 19 MG/DL (ref 8–23)
CALCIUM SERPL-MCNC: 9.6 MG/DL (ref 8.8–10.2)
CHLORIDE SERPL-SCNC: 103 MMOL/L (ref 98–111)
CHP ED QC CHECK: YES
CLARITY UR: ABNORMAL
CO2 SERPL-SCNC: 26 MMOL/L (ref 22–29)
COLOR UR: YELLOW
CREAT SERPL-MCNC: 1.4 MG/DL (ref 0.5–0.9)
CRYSTALS URNS MICRO: ABNORMAL /HPF
EOSINOPHIL # BLD: 0 K/UL (ref 0–0.6)
EOSINOPHIL NFR BLD: 0 % (ref 0–5)
EPI CELLS #/AREA URNS AUTO: 1 /HPF (ref 0–5)
ERYTHROCYTE [DISTWIDTH] IN BLOOD BY AUTOMATED COUNT: 13.7 % (ref 11.5–14.5)
GLUCOSE BLD-MCNC: 87 MG/DL
GLUCOSE BLD-MCNC: 87 MG/DL (ref 70–99)
GLUCOSE SERPL-MCNC: 126 MG/DL (ref 74–109)
GLUCOSE UR STRIP.AUTO-MCNC: NEGATIVE MG/DL
HCT VFR BLD AUTO: 40.6 % (ref 37–47)
HGB BLD-MCNC: 13.2 G/DL (ref 12–16)
HGB UR STRIP.AUTO-MCNC: ABNORMAL MG/L
HYALINE CASTS #/AREA URNS AUTO: 9 /HPF (ref 0–8)
IMM GRANULOCYTES # BLD: 0.1 K/UL
INR PPP: 1.18 (ref 0.88–1.18)
KETONES UR STRIP.AUTO-MCNC: NEGATIVE MG/DL
LACTATE BLDV-SCNC: 2.1 MMOL/L (ref 0.5–1.9)
LEUKOCYTE ESTERASE UR QL STRIP.AUTO: ABNORMAL
LYMPHOCYTES # BLD: 1.4 K/UL (ref 1.1–4.5)
LYMPHOCYTES NFR BLD: 8.4 % (ref 20–40)
MAGNESIUM SERPL-MCNC: 2.1 MG/DL (ref 1.6–2.4)
MCH RBC QN AUTO: 28.4 PG (ref 27–31)
MCHC RBC AUTO-ENTMCNC: 32.5 G/DL (ref 33–37)
MCV RBC AUTO: 87.3 FL (ref 81–99)
MONOCYTES # BLD: 0.9 K/UL (ref 0–0.9)
MONOCYTES NFR BLD: 5.6 % (ref 0–10)
NEUTROPHILS # BLD: 13.7 K/UL (ref 1.5–7.5)
NEUTS SEG NFR BLD: 85.5 % (ref 50–65)
NITRITE UR QL STRIP.AUTO: NEGATIVE
PERFORMED ON: NORMAL
PH UR STRIP.AUTO: 6.5 [PH] (ref 5–8)
PLATELET # BLD AUTO: 400 K/UL (ref 130–400)
PMV BLD AUTO: 11.4 FL (ref 9.4–12.3)
POTASSIUM SERPL-SCNC: 2.7 MMOL/L (ref 3.5–5)
POTASSIUM SERPL-SCNC: 2.8 MMOL/L (ref 3.5–5)
PROT SERPL-MCNC: 7.5 G/DL (ref 6.6–8.7)
PROT UR STRIP.AUTO-MCNC: 100 MG/DL
PROTHROMBIN TIME: 15 SEC (ref 12–14.6)
RBC # BLD AUTO: 4.65 M/UL (ref 4.2–5.4)
RBC #/AREA URNS AUTO: 12 /HPF (ref 0–4)
SARS-COV-2 RDRP RESP QL NAA+PROBE: NOT DETECTED
SODIUM SERPL-SCNC: 141 MMOL/L (ref 136–145)
SP GR UR STRIP.AUTO: 1.02 (ref 1–1.03)
TROPONIN T SERPL-MCNC: 0.56 NG/ML (ref 0–0.03)
UROBILINOGEN UR STRIP.AUTO-MCNC: 1 E.U./DL
WBC # BLD AUTO: 16.1 K/UL (ref 4.8–10.8)
WBC #/AREA URNS AUTO: 115 /HPF (ref 0–5)

## 2023-03-14 PROCEDURE — 93005 ELECTROCARDIOGRAM TRACING: CPT | Performed by: EMERGENCY MEDICINE

## 2023-03-14 PROCEDURE — 84484 ASSAY OF TROPONIN QUANT: CPT

## 2023-03-14 PROCEDURE — 70498 CT ANGIOGRAPHY NECK: CPT

## 2023-03-14 PROCEDURE — 71045 X-RAY EXAM CHEST 1 VIEW: CPT

## 2023-03-14 PROCEDURE — 6360000002 HC RX W HCPCS: Performed by: EMERGENCY MEDICINE

## 2023-03-14 PROCEDURE — 72170 X-RAY EXAM OF PELVIS: CPT

## 2023-03-14 PROCEDURE — 72131 CT LUMBAR SPINE W/O DYE: CPT

## 2023-03-14 PROCEDURE — 80053 COMPREHEN METABOLIC PANEL: CPT

## 2023-03-14 PROCEDURE — 70486 CT MAXILLOFACIAL W/O DYE: CPT

## 2023-03-14 PROCEDURE — 2580000003 HC RX 258: Performed by: EMERGENCY MEDICINE

## 2023-03-14 PROCEDURE — 87186 SC STD MICRODIL/AGAR DIL: CPT

## 2023-03-14 PROCEDURE — 83880 ASSAY OF NATRIURETIC PEPTIDE: CPT

## 2023-03-14 PROCEDURE — 81001 URINALYSIS AUTO W/SCOPE: CPT

## 2023-03-14 PROCEDURE — 71260 CT THORAX DX C+: CPT

## 2023-03-14 PROCEDURE — 87086 URINE CULTURE/COLONY COUNT: CPT

## 2023-03-14 PROCEDURE — 6360000004 HC RX CONTRAST MEDICATION: Performed by: EMERGENCY MEDICINE

## 2023-03-14 PROCEDURE — 85025 COMPLETE CBC W/AUTO DIFF WBC: CPT

## 2023-03-14 PROCEDURE — 96375 TX/PRO/DX INJ NEW DRUG ADDON: CPT

## 2023-03-14 PROCEDURE — 72128 CT CHEST SPINE W/O DYE: CPT

## 2023-03-14 PROCEDURE — 72125 CT NECK SPINE W/O DYE: CPT

## 2023-03-14 PROCEDURE — 83735 ASSAY OF MAGNESIUM: CPT

## 2023-03-14 PROCEDURE — 85610 PROTHROMBIN TIME: CPT

## 2023-03-14 PROCEDURE — 99285 EMERGENCY DEPT VISIT HI MDM: CPT

## 2023-03-14 PROCEDURE — 36415 COLL VENOUS BLD VENIPUNCTURE: CPT

## 2023-03-14 PROCEDURE — 70450 CT HEAD/BRAIN W/O DYE: CPT

## 2023-03-14 RX ORDER — SODIUM CHLORIDE 9 MG/ML
INJECTION, SOLUTION INTRAVENOUS CONTINUOUS
Status: DISCONTINUED | OUTPATIENT
Start: 2023-03-14 | End: 2023-03-14

## 2023-03-14 RX ORDER — 0.9 % SODIUM CHLORIDE 0.9 %
1000 INTRAVENOUS SOLUTION INTRAVENOUS ONCE
Status: DISCONTINUED | OUTPATIENT
Start: 2023-03-14 | End: 2023-03-15

## 2023-03-14 RX ORDER — POTASSIUM CHLORIDE 7.45 MG/ML
10 INJECTION INTRAVENOUS
Status: DISCONTINUED | OUTPATIENT
Start: 2023-03-15 | End: 2023-03-15 | Stop reason: HOSPADM

## 2023-03-14 RX ORDER — LORAZEPAM 2 MG/ML
1 INJECTION INTRAMUSCULAR ONCE
Status: COMPLETED | OUTPATIENT
Start: 2023-03-14 | End: 2023-03-14

## 2023-03-14 RX ORDER — LORAZEPAM 2 MG/ML
0.5 INJECTION INTRAMUSCULAR ONCE
Status: COMPLETED | OUTPATIENT
Start: 2023-03-14 | End: 2023-03-15

## 2023-03-14 RX ADMIN — SODIUM CHLORIDE 1000 ML: 9 INJECTION, SOLUTION INTRAVENOUS at 23:33

## 2023-03-14 RX ADMIN — CEFTRIAXONE 1000 MG: 1 INJECTION, POWDER, FOR SOLUTION INTRAMUSCULAR; INTRAVENOUS at 23:34

## 2023-03-14 RX ADMIN — IOPAMIDOL 90 ML: 755 INJECTION, SOLUTION INTRAVENOUS at 23:46

## 2023-03-14 RX ADMIN — LORAZEPAM 1 MG: 2 INJECTION INTRAMUSCULAR at 22:10

## 2023-03-15 VITALS
HEIGHT: 62 IN | WEIGHT: 130 LBS | DIASTOLIC BLOOD PRESSURE: 68 MMHG | HEART RATE: 81 BPM | RESPIRATION RATE: 21 BRPM | BODY MASS INDEX: 23.92 KG/M2 | SYSTOLIC BLOOD PRESSURE: 97 MMHG | TEMPERATURE: 97.8 F | OXYGEN SATURATION: 93 %

## 2023-03-15 LAB
BNP BLD-MCNC: 7121 PG/ML (ref 0–900)
EKG P AXIS: 37 DEGREES
EKG P-R INTERVAL: 160 MS
EKG Q-T INTERVAL: 394 MS
EKG QRS DURATION: 112 MS
EKG QTC CALCULATION (BAZETT): 457 MS
EKG T AXIS: 24 DEGREES

## 2023-03-15 PROCEDURE — 84132 ASSAY OF SERUM POTASSIUM: CPT

## 2023-03-15 PROCEDURE — 6370000000 HC RX 637 (ALT 250 FOR IP): Performed by: EMERGENCY MEDICINE

## 2023-03-15 PROCEDURE — 93010 ELECTROCARDIOGRAM REPORT: CPT | Performed by: INTERNAL MEDICINE

## 2023-03-15 PROCEDURE — 96366 THER/PROPH/DIAG IV INF ADDON: CPT

## 2023-03-15 PROCEDURE — 96365 THER/PROPH/DIAG IV INF INIT: CPT

## 2023-03-15 PROCEDURE — 36415 COLL VENOUS BLD VENIPUNCTURE: CPT

## 2023-03-15 PROCEDURE — 6360000002 HC RX W HCPCS: Performed by: EMERGENCY MEDICINE

## 2023-03-15 PROCEDURE — 83605 ASSAY OF LACTIC ACID: CPT

## 2023-03-15 PROCEDURE — 82962 GLUCOSE BLOOD TEST: CPT

## 2023-03-15 PROCEDURE — 96376 TX/PRO/DX INJ SAME DRUG ADON: CPT

## 2023-03-15 PROCEDURE — 87040 BLOOD CULTURE FOR BACTERIA: CPT

## 2023-03-15 PROCEDURE — 87635 SARS-COV-2 COVID-19 AMP PRB: CPT

## 2023-03-15 RX ORDER — ASPIRIN 300 MG/1
300 SUPPOSITORY RECTAL ONCE
Status: COMPLETED | OUTPATIENT
Start: 2023-03-15 | End: 2023-03-15

## 2023-03-15 RX ADMIN — POTASSIUM CHLORIDE 10 MEQ: 7.46 INJECTION, SOLUTION INTRAVENOUS at 00:19

## 2023-03-15 RX ADMIN — LORAZEPAM 0.5 MG: 2 INJECTION INTRAMUSCULAR; INTRAVENOUS at 00:02

## 2023-03-15 RX ADMIN — ASPIRIN 300 MG: 300 SUPPOSITORY RECTAL at 01:56

## 2023-03-15 NOTE — ED NOTES
PT accepted to [facility]. Accepted by Dr. Carmenza Salmeron, report # [phone].   Fax# [phone]     Vickie Alvares  03/15/23 0139

## 2023-03-15 NOTE — ED NOTES
Dr. Roberto Vaughan just called a code stroke at 2148.   Dr. Zaina Vicente notified at 2148     Anshul Hensley  03/14/23 2149

## 2023-03-15 NOTE — ED PROVIDER NOTES
[facility] EMERGENCY DEPT  eMERGENCY dEPARTMENT eNCOUnter      Pt Name: Kevin Chaudhari  MRN: [id number]  [place] 1950  Date of evaluation: 3/14/2023  Provider: Jorge Jiménez MD    CHIEF COMPLAINT       Chief Complaint   Patient presents with    Fall     Fall between 1400 and 1600 Hit head and face, increasing confusion post fall per family         HISTORY OF PRESENT ILLNESS   (Location/Symptom, Timing/Onset,Context/Setting, Quality, Duration, Modifying Factors, Severity)  Note limiting factors. Kevin Chaudhari is a 81 y.o. female who presents to the emergency department due to fall and altered mental status. Patient brought in by family. Patient lives alone. Family denies any significant past medical problems and no baseline confusion. They said the family checked on her at 2:00 this afternoon and she was fine and when someone checked on her at 4:00 this afternoon they found her on the floor. She also seemed confused. Still very confused. Last known well was 2 PM this afternoon. Patient seems anxious and confused. She has no other complaints. Has had no fevers per family. Patient denies any pain but assessment is difficult given patient's confusion. Very limited history from the patient    HPI    NursingNotes were reviewed. REVIEW OF SYSTEMS    (2-9 systems for level 4, 10 or more for level 5)     Review of Systems   Unable to perform ROS: Mental status change     A complete review of systems was performed and is negative except as noted above in the HPI.        PAST MEDICAL HISTORY     Past Medical History:   Diagnosis Date    Anemia     Anxiety     Arthritis     Cobalamin deficiency     Depression     Fatigue     Hyperlipidemia     Hypertension     Insomnia          SURGICAL HISTORY       Past Surgical History:   Procedure Laterality Date    COLONOSCOPY N/A 5/24/2016    Dr Kamar Weathers AP (-) dysplasia x 1, HP x 3, 5 yr recall    Navas Ida       Discharge Medication List as of 3/15/2023  3:20 AM        CONTINUE these medications which have NOT CHANGED    Details   aspirin 81 MG chewable tablet Take 1 tablet by mouth daily, Disp-30 tablet,R-3Normal             ALLERGIES     Patient has no known allergies. FAMILY HISTORY       Family History   Problem Relation Age of Onset    Colon Cancer Neg Hx     Colon Polyps Neg Hx     Esophageal Cancer Neg Hx     Liver Disease Neg Hx     Liver Cancer Neg Hx     Stomach Cancer Neg Hx     Rectal Cancer Neg Hx           SOCIAL HISTORY       Social History     Socioeconomic History    Marital status:      Spouse name: None    Number of children: None    Years of education: None    Highest education level: None   Tobacco Use    Smoking status: Every Day     Packs/day: 0.25     Types: Cigarettes   Substance and Sexual Activity    Alcohol use: No    Drug use: No   Social History Narrative     passed away 2014    Patient reportedly has mild dementia lives at home with her daughter who is a staff member on this floor    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 unassisted    Does not fall with any particular frequency    Denies alcohol or tobacco consumption or substance usage       SCREENINGS             PHYSICAL EXAM    (up to 7 for level 4, 8 or more for level 5)     ED Triage Vitals [03/14/23 2027]   BP Temp Temp src Heart Rate Resp SpO2 Height Weight   (!) 130/94 97.8 °F (36.6 °C) -- 89 18 94 % 5' 2\" (1.575 m) 130 lb (59 kg)       Physical Exam  Vitals reviewed. Constitutional:       General: She is not in acute distress. Appearance: She is well-developed. HENT:      Head: Normocephalic. Jaw: There is normal jaw occlusion. No tenderness.         Right Ear: Tympanic membrane, ear canal and external ear normal.      Left Ear: Tympanic membrane, ear canal and external ear normal. Ears:      Comments: No hemotypanum     Mouth/Throat:      Mouth: Mucous membranes are moist.      Pharynx: Oropharynx is clear. Eyes:      General: No scleral icterus. Extraocular Movements: Extraocular movements intact. Pupils: Pupils are equal, round, and reactive to light. Comments: No hyphema. Unable to assess extraocular movements and visual fields because of patient's confusion   Neck:      Vascular: No JVD. Cardiovascular:      Rate and Rhythm: Normal rate and regular rhythm. Pulses: Normal pulses. Heart sounds: Normal heart sounds. Pulmonary:      Effort: Pulmonary effort is normal. No respiratory distress. Breath sounds: Normal breath sounds. Chest:      Chest wall: No tenderness. Abdominal:      General: There is no distension. Palpations: Abdomen is soft. Tenderness: There is no abdominal tenderness. There is no guarding or rebound. Musculoskeletal:         General: No swelling or tenderness. Normal range of motion. Cervical back: Normal range of motion and neck supple. No tenderness. Comments: No obvious C, T or L-spine tenderness or step-off. Patient does indicate discomfort when I palpate her spine but she said it is because my hands are cold. Obviously, limited exam.  No tenderness in the arms or legs or pelvis. Pelvis stable. Neurovascular intact distally all 4 extremities. Skin:     General: Skin is warm and dry. Capillary Refill: Capillary refill takes less than 2 seconds. Neurological:      Mental Status: She is alert. She is confused. GCS: GCS eye subscore is 4. GCS verbal subscore is 4. GCS motor subscore is 6. Cranial Nerves: No dysarthria or facial asymmetry. Motor: No weakness. Comments: Limited exam.  Confused. Moving all 4 extremities equally without any obvious focal deficit but exam is very limited   Psychiatric:         Attention and Perception: She is inattentive.          Mood and Affect: Mood is anxious. Behavior: Behavior normal.         Cognition and Memory: Cognition is impaired. Memory is impaired. DIAGNOSTIC RESULTS     EKG: All EKG's are interpreted by the Emergency Department Physician who either signs or Co-signs this chart in the absence of a cardiologist.    Normal sinus rhythm. Normal QT. Mild artifact. Borderline ST changes    RADIOLOGY:   Non-plain film images such as CT, Ultrasound and MRI are read by the radiologist. Estanislado Jessica images are visualized and preliminarily interpreted by the emergency physician with the below findings:        Interpretation per the Radiologist below, if available at the time of this note:    CT CHEST W CONTRAST   Final Result   Tortuous and ectatic ascending aorta measured 35 mm in diameter. Cardiomegaly with mild calcified atherosclerotic disease of the coronary arteries. Moderate centrilobular emphysematous changes with bronchitis, which may be infectious or    inflammatory etiologies. No consolidation. Abdominal aortic aneurysm measuring 36 x 36 mm. Electronically signed by Nurys Motta MD on 03-15-23 at [address] [place] [address]   Final Result   High-grade stenosis of a mid left M2 segment with diminished enhancement of the distal vessel concern for acute thromboembolic disease._______________________________________________EXAM:  CT Angiography Neck With Intravenous    ContrastCLINICAL HISTORY:   Reason for exam: Stroke Symptoms. TECHNIQUE:  Routine carotid CT angiography protocol was performed with intravenous contrast.  NASCET criteria using the distal ICAs for comparison were used for evaluation of stenoses. CTDI is    21.05 mGy and DLP is 583.66 mGy-cm. MIP reconstructed images were created and reviewed. COMPARISON:  None. FINDINGS: VASCULATURE:  Right common carotid artery:  Unremarkable. No occlusion or significant stenosis. No dissection. Right internal carotid    artery:  Unremarkable.   Extracranial segment is patent with no occlusion or significant stenosis. No dissection. Right external carotid artery:  Unremarkable. No occlusion. Right vertebral artery:  Unremarkable. No occlusion or significant stenosis. No dissection. Left common carotid artery:  Unremarkable. No occlusion or significant stenosis. No dissection. Left internal carotid artery:  Unremarkable. Extracranial segment is patent with no occlusion or significant stenosis. No dissection. Left external carotid artery:  Unremarkable. No occlusion. Left vertebral artery:  Unremarkable. No occlusion or significant stenosis. No dissection. NECK:  Bones/joints: Moderate to advanced disc degeneration at C2-3, C3-4, C4-5, C5-6 and C6-7. Soft tissues: Prominent cervical lymph nodes. Calcified mediastinal lymph nodes. Lung apices: Moderate centrilobular emphysematous changes. Peribronchial thickening. Benign calcified granuloma at the right lung apex. CAROTID STENOSIS REFERENCE USING    NASCET CRITERIA:  % ICA stenosis = (1 - narrowest ICA diameter/diameter of distal cervical ICA) x 100. Mild - <50% stenosis. Moderate - 50-69% stenosis. Severe - 70-94% stenosis. Near occlusion - 95-99% stenosis. Occluded - 100% stenosis. IMPRESSION:        Negative CT angiogram of the neck. Communications:03/15/23 00:59 Call Doctor Regarding Stroke, called  Dr. Pallavi Soto on 03/15 00:59 (-05:00)Electronically signed by Edmond Ferris MD on 03-15-23 at [facility]   Final Result   No subacute facial bone pathology. Electronically signed by Edmond Ferris MD on 03-14-23 at [address] [place] [address]   Final Result   Findings concerning for a minimally displaced superior endplate fracture of the T1 vertebral body. No evidence of acute cervical spine pathology. Electronically signed by Edmond Ferris MD on 03-14-23 at 2304       [facility]   Final Result   Findings concerning for an acute minimally displaced superior endplate fracture of the T1 vertebral body. Remote fracture deformities of the T4, T5 and T8 segments. Electronically signed by Weston Elena MD on 03-14-23 at [facility]   Final Result   No evidence of acute lumbar spine pathology. Infrarenal abdominal aortic aneurysm measuring 35 x 35 mm. Remote fracture deformity of the L4 vertebral body. Chronic lytic spondylolisthesis of L5 on S1. Electronically signed by Weston Elena MD on 03-14-23 at [address] [place] [address]   Final Result   Findings concerning for bronchitis with pneumonitis, which can be seen with atypical pneumonia. Cardiomegaly with widened mediastinum. Recommend CT scan of the chest for further evaluation. Electronically signed by Weston Elena MD on    03-14-23 at 2255      XR PELVIS (1-2 VIEWS)   Final Result   No evidence of acute fracture or dislocation. If there is continued clinical concern, a CT scan may be of benefit for further evaluation. Electronically signed by Weston Elena MD on 03-14-23 at 2258      CT [address]   Final Result   Hyperdense left M2 segment with possible subcortical edema in the left frontal and parietal lobes concerning for acute ischemic injury. Recommend CT angiogram of the head and neck for further evaluation. Communications:03/14/23 22:48 Call    Doctor Regarding Stroke, called  Dr. Ankita Johnson on 03/14 22:49 (-05:00)Electronically signed by Weston Eelna MD on 03-14-23 at 2239            ED BEDSIDE ULTRASOUND:   Performed by ED Physician - none    LABS:  Labs Reviewed   CBC WITH AUTO DIFFERENTIAL - Abnormal; Notable for the following components:       Result Value    WBC 16.1 (*)     MCHC 32.5 (*)     Neutrophils % 85.5 (*)     Lymphocytes % 8.4 (*)     Neutrophils Absolute 13.7 (*)     All other components within normal limits   COMPREHENSIVE METABOLIC PANEL W/ REFLEX TO MG FOR LOW K - Abnormal; Notable for the following components:    Potassium reflex Magnesium 2.8 (*)     Glucose 126 (*)     Creatinine 1.4 (*)     Est, Glom Filt Rate 40 (*)     Alkaline Phosphatase 118 (*)     AST 64 (*)     All other components within normal limits    Narrative:     CALL  Leyva  [facility]UMA[facility]CHR[facility] tel. ,  Chemistry results called to and read back by Loco Mazariegos RN IN ED,  03/14/2023 22:06, by [facility]   TROPONIN - Abnormal; Notable for the following components:    Troponin 0.56 (*)     All other components within normal limits    Narrative:     [address] tel[address] ,  Chemistry results called to and read back by Loco Mazariegos [address] [employer] [place] ED,  03/14/2023 22:06, by [facility]   PROTIME-INR - Abnormal; Notable for the following components:    Protime 15.0 (*)     All other components within normal limits   URINALYSIS WITH REFLEX TO CULTURE - Abnormal; Notable for the following components:    Clarity, UA TURBID (*)     Blood, Urine LARGE (*)     Protein,  (*)     Leukocyte Esterase, Urine MODERATE (*)     All other components within normal limits   POTASSIUM - Abnormal; Notable for the following components:    Potassium 2.7 (*)     All other components within normal limits    Narrative:     CALL  Mousie  [employer] tel. ,  Chemistry results called to and read back by Loco Mazariegos RN IN ED,  03/14/2023 23:34, by [facility]   MICROSCOPIC URINALYSIS - Abnormal; Notable for the following components:    Bacteria, UA 4+ (*)     Crystals, UA NEG (*)     Hyaline Casts, UA 9 (*)     WBC,  (*)     RBC, UA 12 (*)     All other components within normal limits   LACTIC ACID - Abnormal; Notable for the following components:    Lactic Acid 2.1 (*)     All other components within normal limits    Narrative:     CALL  Mousie  [employer] tel. ,  Chemistry results called to and read back by [facility] ED,  03/14/2023 23:42, by [address] - Abnormal; Notable for the following components:    Pro-BNP 7,121 (*)     All other components within normal limits   POCT GLUCOSE - Normal   COVID-19, RAPID   CULTURE, URINE   CULTURE, BLOOD 1   CULTURE, BLOOD 2   MAGNESIUM    Narrative:     CALL  Leyva  KLED tel. ,  Chemistry results called to and read back by Ke Hagan RN IN ED,  03/14/2023 22:06, by [facility]   LACTIC ACID   POCT GLUCOSE       All other labs were within normal range or not returned as of this dictation. EMERGENCY DEPARTMENT COURSE and DIFFERENTIALDIAGNOSIS/MDM:   Vitals:    Vitals:    03/14/23 2027 03/14/23 2330 03/14/23 2345 03/15/23 0100   BP: (!) 130/94  114/75 97/68   Pulse: 89 86 87 81   Resp: 18 16 25 21   Temp: 97.8 °F (36.6 °C)      SpO2: 94% 92% 95% 93%   Weight: 130 lb (59 kg)      Height: 5' 2\" (1.575 m)          MDM  Critical Care  Total time providing critical care: (75)      Immediately after I saw patient called stroke alert. No obvious focal deficits on exam but clearly confused and family said this is new. Last known well 2 PM in the afternoon so not a tPA candidate if this is indeed a stroke. Case discussed with on-call neurologist, Dr. Heather Fuller, who is agreeable plan of care and plan. CT tech was unable to obtain CTA of the head neck initially due to patient's movement. Patient was given a small dose of Ativan after which she was able to obtain patient's other CT scans but could not get patient to hold still enough for the CTA. Dr. Madelaine Maxwell recommended further attempts to obtain CTA which we will pursue. We will consider giving patient some Haldol to try to calm her down for CT scan. Patient has evidence of hypokalemia. With this in mind, feel that Haldol may be higher risk for possibly causing hypokalemia and possibly causing QT prolongation. Discussed patient's case with pharmacist, Isabela Olmos, who has similar concerns and advised not giving Haldol. After another small dose of Ativan CT was able to perform CTA of the head and neck as well as chest CT with contrast.  Patient does have a T1 fracture.   Case discussed with Dr. Elie Collet, neurosurgery, who reviewed images and said no intervention needed for patient's T1 fracture at this time and no bracing needed. Patient's urinalysis consistent with UTI. Rocephin ordered. Fluids ordered as well but holding off on aggressive hydration given patient's elevated troponin and BNP. Potassium replacement ordered. Discussed/reviewed all of patient's results with patient's family including incidental findings. Given CTA findings call placed to [address] [place] [address] to discuss possible transfer as patient may be candidate for clot retrieval.  Patient still very confused but currently resting comfortably in no distress. 0105 spoke with [address] [place] [address]. They are at capacity and will be speaking with the  and calling me back    0130 spoke with [address] [place] [address] again. Dr. Ashlie Haque accepts patient to [address] [place] [address] on behalf of Dr. Jaramillo. Patient resting comfortably. Family updated about plan    CONSULTS:  IP CONSULT TO PHARMACY  PHARMACY TO CHANGE BASE FLUIDS  IP CONSULT TO NEUROLOGY    PROCEDURES:  Unless otherwise notedbelow, none     Procedures    FINAL IMPRESSION     1. Altered mental status, unspecified altered mental status type    2. Cerebrovascular accident (CVA), unspecified mechanism ([address]    3. Urinary tract infection without hematuria, site unspecified    4. Hypokalemia    5. Elevated troponin    6. Abnormal CT scan    7. Closed fracture of first thoracic vertebra, unspecified fracture morphology, initial encounter ([address]    8.  CARITO (acute kidney injury) ([address]    9. Elevated brain natriuretic peptide (BNP) level          DISPOSITION/PLAN   DISPOSITION Decision To Transfer 03/15/2023 01:30:24 AM      PATIENT REFERRED TO:  @FUP@    DISCHARGE MEDICATIONS:  Discharge Medication List as of 3/15/2023  3:20 AM             (Please note that portions of this note were completed with a voice recognition program.  Efforts were made to edit the dictations butoccasionally words are mis-transcribed.)    Km Crowe MD (electronically signed)  AttendingEmergency Physician          Km Crowe MD  03/15/23 5833

## 2023-03-16 LAB
BACTERIA BLD CULT ORG #2: NORMAL
BACTERIA BLD CULT: NORMAL

## 2023-03-17 LAB
BACTERIA UR CULT: ABNORMAL
BACTERIA UR CULT: ABNORMAL
ORGANISM: ABNORMAL

## 2023-03-20 LAB
BACTERIA BLD CULT ORG #2: NORMAL
BACTERIA BLD CULT: NORMAL

## 2023-04-02 ENCOUNTER — APPOINTMENT (OUTPATIENT)
Dept: CT IMAGING | Age: 73
End: 2023-04-02
Payer: MEDICARE

## 2023-04-02 ENCOUNTER — APPOINTMENT (OUTPATIENT)
Dept: GENERAL RADIOLOGY | Age: 73
End: 2023-04-02
Payer: MEDICARE

## 2023-04-02 ENCOUNTER — HOSPITAL ENCOUNTER (INPATIENT)
Age: 73
LOS: 5 days | Discharge: NURSING, REHAB OR LONG-TERM CARE | End: 2023-04-07
Attending: HOSPITALIST | Admitting: INTERNAL MEDICINE
Payer: MEDICARE

## 2023-04-02 DIAGNOSIS — S72.114A CLOSED NONDISPLACED FRACTURE OF GREATER TROCHANTER OF RIGHT FEMUR, INITIAL ENCOUNTER (HCC): Primary | ICD-10-CM

## 2023-04-02 DIAGNOSIS — S72.001A HIP FX, RIGHT, CLOSED, INITIAL ENCOUNTER (HCC): ICD-10-CM

## 2023-04-02 LAB
ALBUMIN SERPL-MCNC: 3.8 G/DL (ref 3.5–5.2)
ALP SERPL-CCNC: 126 U/L (ref 35–104)
ALT SERPL-CCNC: 16 U/L (ref 5–33)
ANION GAP SERPL CALCULATED.3IONS-SCNC: 11 MMOL/L (ref 7–19)
APTT PPP: 28.4 SEC (ref 26–36.2)
AST SERPL-CCNC: 18 U/L (ref 5–32)
BASOPHILS # BLD: 0 K/UL (ref 0–0.2)
BASOPHILS NFR BLD: 0.2 % (ref 0–1)
BILIRUB SERPL-MCNC: 1 MG/DL (ref 0.2–1.2)
BNP BLD-MCNC: 1295 PG/ML (ref 0–900)
BUN SERPL-MCNC: 11 MG/DL (ref 8–23)
CALCIUM SERPL-MCNC: 9.6 MG/DL (ref 8.8–10.2)
CHLORIDE SERPL-SCNC: 100 MMOL/L (ref 98–111)
CO2 SERPL-SCNC: 25 MMOL/L (ref 22–29)
CREAT SERPL-MCNC: 0.9 MG/DL (ref 0.5–0.9)
EOSINOPHIL # BLD: 0 K/UL (ref 0–0.6)
EOSINOPHIL NFR BLD: 0.1 % (ref 0–5)
ERYTHROCYTE [DISTWIDTH] IN BLOOD BY AUTOMATED COUNT: 15.4 % (ref 11.5–14.5)
GLUCOSE SERPL-MCNC: 118 MG/DL (ref 74–109)
HCT VFR BLD AUTO: 42.5 % (ref 37–47)
HGB BLD-MCNC: 13.6 G/DL (ref 12–16)
IMM GRANULOCYTES # BLD: 0 K/UL
INR PPP: 1.24 (ref 0.88–1.18)
LYMPHOCYTES # BLD: 1.4 K/UL (ref 1.1–4.5)
LYMPHOCYTES NFR BLD: 13.9 % (ref 20–40)
MAGNESIUM SERPL-MCNC: 1.8 MG/DL (ref 1.6–2.4)
MCH RBC QN AUTO: 28.6 PG (ref 27–31)
MCHC RBC AUTO-ENTMCNC: 32 G/DL (ref 33–37)
MCV RBC AUTO: 89.3 FL (ref 81–99)
MONOCYTES # BLD: 0.7 K/UL (ref 0–0.9)
MONOCYTES NFR BLD: 6.7 % (ref 0–10)
NEUTROPHILS # BLD: 8.1 K/UL (ref 1.5–7.5)
NEUTS SEG NFR BLD: 78.8 % (ref 50–65)
PLATELET # BLD AUTO: 250 K/UL (ref 130–400)
PMV BLD AUTO: 11 FL (ref 9.4–12.3)
POTASSIUM SERPL-SCNC: 3.6 MMOL/L (ref 3.5–5)
PROT SERPL-MCNC: 7.4 G/DL (ref 6.6–8.7)
PROTHROMBIN TIME: 15.6 SEC (ref 12–14.6)
RBC # BLD AUTO: 4.76 M/UL (ref 4.2–5.4)
SARS-COV-2 RDRP RESP QL NAA+PROBE: NOT DETECTED
SODIUM SERPL-SCNC: 136 MMOL/L (ref 136–145)
TSH SERPL DL<=0.005 MIU/L-ACNC: 3.11 UIU/ML (ref 0.27–4.2)
VIT B12 SERPL-MCNC: 441 PG/ML (ref 211–946)
WBC # BLD AUTO: 10.3 K/UL (ref 4.8–10.8)

## 2023-04-02 PROCEDURE — 1210000000 HC MED SURG R&B

## 2023-04-02 PROCEDURE — 80053 COMPREHEN METABOLIC PANEL: CPT

## 2023-04-02 PROCEDURE — 82607 VITAMIN B-12: CPT

## 2023-04-02 PROCEDURE — 72170 X-RAY EXAM OF PELVIS: CPT

## 2023-04-02 PROCEDURE — 84443 ASSAY THYROID STIM HORMONE: CPT

## 2023-04-02 PROCEDURE — 2580000003 HC RX 258: Performed by: HOSPITALIST

## 2023-04-02 PROCEDURE — 73700 CT LOWER EXTREMITY W/O DYE: CPT

## 2023-04-02 PROCEDURE — 73552 X-RAY EXAM OF FEMUR 2/>: CPT

## 2023-04-02 PROCEDURE — 6360000002 HC RX W HCPCS: Performed by: PHYSICIAN ASSISTANT

## 2023-04-02 PROCEDURE — 99285 EMERGENCY DEPT VISIT HI MDM: CPT

## 2023-04-02 PROCEDURE — 6370000000 HC RX 637 (ALT 250 FOR IP): Performed by: PHYSICIAN ASSISTANT

## 2023-04-02 PROCEDURE — 85730 THROMBOPLASTIN TIME PARTIAL: CPT

## 2023-04-02 PROCEDURE — 87635 SARS-COV-2 COVID-19 AMP PRB: CPT

## 2023-04-02 PROCEDURE — 83735 ASSAY OF MAGNESIUM: CPT

## 2023-04-02 PROCEDURE — 36415 COLL VENOUS BLD VENIPUNCTURE: CPT

## 2023-04-02 PROCEDURE — 83880 ASSAY OF NATRIURETIC PEPTIDE: CPT

## 2023-04-02 PROCEDURE — 85025 COMPLETE CBC W/AUTO DIFF WBC: CPT

## 2023-04-02 PROCEDURE — 85610 PROTHROMBIN TIME: CPT

## 2023-04-02 PROCEDURE — 71045 X-RAY EXAM CHEST 1 VIEW: CPT

## 2023-04-02 RX ORDER — TRAZODONE HYDROCHLORIDE 50 MG/1
50 TABLET ORAL NIGHTLY PRN
Status: DISCONTINUED | OUTPATIENT
Start: 2023-04-03 | End: 2023-04-07 | Stop reason: HOSPADM

## 2023-04-02 RX ORDER — OXYCODONE HYDROCHLORIDE AND ACETAMINOPHEN 5; 325 MG/1; MG/1
1 TABLET ORAL ONCE
Status: COMPLETED | OUTPATIENT
Start: 2023-04-02 | End: 2023-04-02

## 2023-04-02 RX ORDER — POLYETHYLENE GLYCOL 3350 17 G/17G
17 POWDER, FOR SOLUTION ORAL DAILY PRN
Status: DISCONTINUED | OUTPATIENT
Start: 2023-04-02 | End: 2023-04-07 | Stop reason: HOSPADM

## 2023-04-02 RX ORDER — POLYETHYLENE GLYCOL 3350 17 G/17G
17 POWDER, FOR SOLUTION ORAL DAILY
Status: DISCONTINUED | OUTPATIENT
Start: 2023-04-03 | End: 2023-04-07 | Stop reason: HOSPADM

## 2023-04-02 RX ORDER — SODIUM CHLORIDE 0.9 % (FLUSH) 0.9 %
5-40 SYRINGE (ML) INJECTION PRN
Status: DISCONTINUED | OUTPATIENT
Start: 2023-04-02 | End: 2023-04-07 | Stop reason: HOSPADM

## 2023-04-02 RX ORDER — ACETAMINOPHEN 650 MG/1
650 SUPPOSITORY RECTAL EVERY 6 HOURS PRN
Status: DISCONTINUED | OUTPATIENT
Start: 2023-04-02 | End: 2023-04-07 | Stop reason: HOSPADM

## 2023-04-02 RX ORDER — MORPHINE SULFATE 4 MG/ML
2 INJECTION, SOLUTION INTRAMUSCULAR; INTRAVENOUS
Status: DISCONTINUED | OUTPATIENT
Start: 2023-04-02 | End: 2023-04-03

## 2023-04-02 RX ORDER — NALOXONE HYDROCHLORIDE 0.4 MG/ML
0.4 INJECTION, SOLUTION INTRAMUSCULAR; INTRAVENOUS; SUBCUTANEOUS PRN
Status: DISCONTINUED | OUTPATIENT
Start: 2023-04-02 | End: 2023-04-07 | Stop reason: HOSPADM

## 2023-04-02 RX ORDER — ONDANSETRON 2 MG/ML
4 INJECTION INTRAMUSCULAR; INTRAVENOUS EVERY 6 HOURS PRN
Status: DISCONTINUED | OUTPATIENT
Start: 2023-04-02 | End: 2023-04-07 | Stop reason: HOSPADM

## 2023-04-02 RX ORDER — ASPIRIN 81 MG/1
81 TABLET, CHEWABLE ORAL DAILY
Status: DISCONTINUED | OUTPATIENT
Start: 2023-04-03 | End: 2023-04-07 | Stop reason: HOSPADM

## 2023-04-02 RX ORDER — ENOXAPARIN SODIUM 100 MG/ML
40 INJECTION SUBCUTANEOUS DAILY
Status: DISCONTINUED | OUTPATIENT
Start: 2023-04-03 | End: 2023-04-07 | Stop reason: HOSPADM

## 2023-04-02 RX ORDER — MORPHINE SULFATE 4 MG/ML
4 INJECTION, SOLUTION INTRAMUSCULAR; INTRAVENOUS
Status: DISCONTINUED | OUTPATIENT
Start: 2023-04-02 | End: 2023-04-03

## 2023-04-02 RX ORDER — TRAZODONE HYDROCHLORIDE 50 MG/1
50 TABLET ORAL NIGHTLY
Qty: 30 TABLET | Refills: 1 | COMMUNITY
Start: 2023-03-21 | End: 2023-04-21

## 2023-04-02 RX ORDER — SODIUM CHLORIDE 0.9 % (FLUSH) 0.9 %
5-40 SYRINGE (ML) INJECTION EVERY 12 HOURS SCHEDULED
Status: DISCONTINUED | OUTPATIENT
Start: 2023-04-02 | End: 2023-04-07 | Stop reason: HOSPADM

## 2023-04-02 RX ORDER — SODIUM CHLORIDE 9 MG/ML
INJECTION, SOLUTION INTRAVENOUS PRN
Status: DISCONTINUED | OUTPATIENT
Start: 2023-04-02 | End: 2023-04-07 | Stop reason: HOSPADM

## 2023-04-02 RX ORDER — ACETAMINOPHEN 325 MG/1
650 TABLET ORAL EVERY 6 HOURS PRN
Status: DISCONTINUED | OUTPATIENT
Start: 2023-04-02 | End: 2023-04-07 | Stop reason: HOSPADM

## 2023-04-02 RX ORDER — SODIUM CHLORIDE 9 MG/ML
INJECTION, SOLUTION INTRAVENOUS CONTINUOUS
Status: DISCONTINUED | OUTPATIENT
Start: 2023-04-02 | End: 2023-04-07 | Stop reason: HOSPADM

## 2023-04-02 RX ORDER — OXYCODONE HYDROCHLORIDE AND ACETAMINOPHEN 5; 325 MG/1; MG/1
1 TABLET ORAL EVERY 8 HOURS PRN
Status: DISCONTINUED | OUTPATIENT
Start: 2023-04-02 | End: 2023-04-03

## 2023-04-02 RX ORDER — LORAZEPAM 2 MG/ML
0.5 INJECTION INTRAMUSCULAR ONCE
Status: COMPLETED | OUTPATIENT
Start: 2023-04-02 | End: 2023-04-02

## 2023-04-02 RX ORDER — ONDANSETRON 4 MG/1
4 TABLET, ORALLY DISINTEGRATING ORAL EVERY 8 HOURS PRN
Status: DISCONTINUED | OUTPATIENT
Start: 2023-04-02 | End: 2023-04-07 | Stop reason: HOSPADM

## 2023-04-02 RX ADMIN — OXYCODONE HYDROCHLORIDE AND ACETAMINOPHEN 1 TABLET: 5; 325 TABLET ORAL at 20:37

## 2023-04-02 RX ADMIN — SODIUM CHLORIDE: 9 INJECTION, SOLUTION INTRAVENOUS at 23:29

## 2023-04-02 RX ADMIN — LORAZEPAM 0.5 MG: 2 INJECTION INTRAMUSCULAR; INTRAVENOUS at 21:59

## 2023-04-02 RX ADMIN — SODIUM CHLORIDE, PRESERVATIVE FREE 10 ML: 5 INJECTION INTRAVENOUS at 23:29

## 2023-04-02 ASSESSMENT — ENCOUNTER SYMPTOMS
BACK PAIN: 0
SHORTNESS OF BREATH: 0
VOMITING: 0
ABDOMINAL PAIN: 0

## 2023-04-02 ASSESSMENT — PAIN SCALES - GENERAL: PAINLEVEL_OUTOF10: 9

## 2023-04-02 ASSESSMENT — PAIN DESCRIPTION - ORIENTATION: ORIENTATION: RIGHT

## 2023-04-02 ASSESSMENT — LIFESTYLE VARIABLES
HOW MANY STANDARD DRINKS CONTAINING ALCOHOL DO YOU HAVE ON A TYPICAL DAY: PATIENT DOES NOT DRINK
HOW OFTEN DO YOU HAVE A DRINK CONTAINING ALCOHOL: NEVER

## 2023-04-02 ASSESSMENT — PAIN DESCRIPTION - LOCATION: LOCATION: LEG;HIP

## 2023-04-03 LAB
ALBUMIN SERPL-MCNC: 3.4 G/DL (ref 3.5–5.2)
ALP SERPL-CCNC: 109 U/L (ref 35–104)
ALT SERPL-CCNC: 13 U/L (ref 5–33)
ANION GAP SERPL CALCULATED.3IONS-SCNC: 12 MMOL/L (ref 7–19)
AST SERPL-CCNC: 17 U/L (ref 5–32)
BACTERIA URNS QL MICRO: NEGATIVE /HPF
BILIRUB SERPL-MCNC: 0.9 MG/DL (ref 0.2–1.2)
BILIRUB UR QL STRIP: NEGATIVE
BNP BLD-MCNC: 923 PG/ML (ref 0–900)
BUN SERPL-MCNC: 11 MG/DL (ref 8–23)
CALCIUM SERPL-MCNC: 8.7 MG/DL (ref 8.8–10.2)
CHLORIDE SERPL-SCNC: 107 MMOL/L (ref 98–111)
CLARITY UR: CLEAR
CO2 SERPL-SCNC: 21 MMOL/L (ref 22–29)
COLOR UR: YELLOW
CREAT SERPL-MCNC: 0.9 MG/DL (ref 0.5–0.9)
CRYSTALS URNS MICRO: ABNORMAL /HPF
D DIMER PPP FEU-MCNC: 1.79 UG/ML FEU (ref 0–0.48)
EPI CELLS #/AREA URNS AUTO: 8 /HPF (ref 0–5)
ERYTHROCYTE [DISTWIDTH] IN BLOOD BY AUTOMATED COUNT: 15.6 % (ref 11.5–14.5)
GLUCOSE SERPL-MCNC: 86 MG/DL (ref 74–109)
GLUCOSE UR STRIP.AUTO-MCNC: NEGATIVE MG/DL
HCT VFR BLD AUTO: 41.9 % (ref 37–47)
HGB BLD-MCNC: 12.8 G/DL (ref 12–16)
HGB UR STRIP.AUTO-MCNC: NEGATIVE MG/L
HYALINE CASTS #/AREA URNS AUTO: 5 /HPF (ref 0–8)
KETONES UR STRIP.AUTO-MCNC: NEGATIVE MG/DL
LEUKOCYTE ESTERASE UR QL STRIP.AUTO: ABNORMAL
MCH RBC QN AUTO: 29.1 PG (ref 27–31)
MCHC RBC AUTO-ENTMCNC: 30.5 G/DL (ref 33–37)
MCV RBC AUTO: 95.2 FL (ref 81–99)
NITRITE UR QL STRIP.AUTO: NEGATIVE
PH UR STRIP.AUTO: 6 [PH] (ref 5–8)
PLATELET # BLD AUTO: 181 K/UL (ref 130–400)
PMV BLD AUTO: 11.8 FL (ref 9.4–12.3)
POTASSIUM SERPL-SCNC: 3.9 MMOL/L (ref 3.5–5)
PROT SERPL-MCNC: 6.1 G/DL (ref 6.6–8.7)
PROT UR STRIP.AUTO-MCNC: ABNORMAL MG/DL
RBC # BLD AUTO: 4.4 M/UL (ref 4.2–5.4)
RBC #/AREA URNS AUTO: 3 /HPF (ref 0–4)
SODIUM SERPL-SCNC: 140 MMOL/L (ref 136–145)
SP GR UR STRIP.AUTO: 1.01 (ref 1–1.03)
TROPONIN T SERPL-MCNC: 0.03 NG/ML (ref 0–0.03)
UROBILINOGEN UR STRIP.AUTO-MCNC: 1 E.U./DL
WBC # BLD AUTO: 7.9 K/UL (ref 4.8–10.8)
WBC #/AREA URNS AUTO: 12 /HPF (ref 0–5)

## 2023-04-03 PROCEDURE — 6370000000 HC RX 637 (ALT 250 FOR IP): Performed by: HOSPITALIST

## 2023-04-03 PROCEDURE — 93005 ELECTROCARDIOGRAM TRACING: CPT | Performed by: HOSPITALIST

## 2023-04-03 PROCEDURE — 87086 URINE CULTURE/COLONY COUNT: CPT

## 2023-04-03 PROCEDURE — 97530 THERAPEUTIC ACTIVITIES: CPT

## 2023-04-03 PROCEDURE — 94760 N-INVAS EAR/PLS OXIMETRY 1: CPT

## 2023-04-03 PROCEDURE — 2700000000 HC OXYGEN THERAPY PER DAY

## 2023-04-03 PROCEDURE — 6360000002 HC RX W HCPCS: Performed by: HOSPITALIST

## 2023-04-03 PROCEDURE — 97165 OT EVAL LOW COMPLEX 30 MIN: CPT

## 2023-04-03 PROCEDURE — 84484 ASSAY OF TROPONIN QUANT: CPT

## 2023-04-03 PROCEDURE — 85379 FIBRIN DEGRADATION QUANT: CPT

## 2023-04-03 PROCEDURE — 81001 URINALYSIS AUTO W/SCOPE: CPT

## 2023-04-03 PROCEDURE — 2580000003 HC RX 258: Performed by: HOSPITALIST

## 2023-04-03 PROCEDURE — 6370000000 HC RX 637 (ALT 250 FOR IP): Performed by: INTERNAL MEDICINE

## 2023-04-03 PROCEDURE — 36415 COLL VENOUS BLD VENIPUNCTURE: CPT

## 2023-04-03 PROCEDURE — 1210000000 HC MED SURG R&B

## 2023-04-03 PROCEDURE — 83880 ASSAY OF NATRIURETIC PEPTIDE: CPT

## 2023-04-03 PROCEDURE — 80053 COMPREHEN METABOLIC PANEL: CPT

## 2023-04-03 PROCEDURE — 97162 PT EVAL MOD COMPLEX 30 MIN: CPT

## 2023-04-03 PROCEDURE — 97535 SELF CARE MNGMENT TRAINING: CPT

## 2023-04-03 PROCEDURE — 85027 COMPLETE CBC AUTOMATED: CPT

## 2023-04-03 PROCEDURE — 6360000002 HC RX W HCPCS: Performed by: NURSE PRACTITIONER

## 2023-04-03 RX ORDER — TRAMADOL HYDROCHLORIDE 50 MG/1
50 TABLET ORAL EVERY 6 HOURS PRN
Status: DISCONTINUED | OUTPATIENT
Start: 2023-04-03 | End: 2023-04-07 | Stop reason: HOSPADM

## 2023-04-03 RX ORDER — MORPHINE SULFATE 2 MG/ML
2 INJECTION, SOLUTION INTRAMUSCULAR; INTRAVENOUS EVERY 4 HOURS PRN
Status: DISCONTINUED | OUTPATIENT
Start: 2023-04-03 | End: 2023-04-03

## 2023-04-03 RX ORDER — HALOPERIDOL 5 MG/ML
2 INJECTION INTRAMUSCULAR EVERY 4 HOURS PRN
Status: COMPLETED | OUTPATIENT
Start: 2023-04-03 | End: 2023-04-05

## 2023-04-03 RX ORDER — MIRTAZAPINE 15 MG/1
15 TABLET, ORALLY DISINTEGRATING ORAL NIGHTLY
Status: DISCONTINUED | OUTPATIENT
Start: 2023-04-03 | End: 2023-04-07 | Stop reason: HOSPADM

## 2023-04-03 RX ORDER — CYCLOBENZAPRINE HCL 10 MG
10 TABLET ORAL 3 TIMES DAILY PRN
Status: DISCONTINUED | OUTPATIENT
Start: 2023-04-03 | End: 2023-04-03

## 2023-04-03 RX ORDER — LORAZEPAM 2 MG/ML
0.5 INJECTION INTRAMUSCULAR ONCE
Status: COMPLETED | OUTPATIENT
Start: 2023-04-03 | End: 2023-04-03

## 2023-04-03 RX ADMIN — MEROPENEM 1000 MG: 1 INJECTION, POWDER, FOR SOLUTION INTRAVENOUS at 21:33

## 2023-04-03 RX ADMIN — MORPHINE SULFATE 2 MG: 2 INJECTION, SOLUTION INTRAMUSCULAR; INTRAVENOUS at 14:43

## 2023-04-03 RX ADMIN — LORAZEPAM 0.5 MG: 2 INJECTION INTRAMUSCULAR; INTRAVENOUS at 06:04

## 2023-04-03 RX ADMIN — TRAZODONE HYDROCHLORIDE 50 MG: 50 TABLET ORAL at 20:06

## 2023-04-03 RX ADMIN — MEROPENEM 1000 MG: 1 INJECTION, POWDER, FOR SOLUTION INTRAVENOUS at 14:45

## 2023-04-03 RX ADMIN — CYCLOBENZAPRINE 10 MG: 10 TABLET, FILM COATED ORAL at 18:17

## 2023-04-03 RX ADMIN — SODIUM CHLORIDE, PRESERVATIVE FREE 10 ML: 5 INJECTION INTRAVENOUS at 19:48

## 2023-04-03 RX ADMIN — OXYCODONE HYDROCHLORIDE AND ACETAMINOPHEN 1 TABLET: 5; 325 TABLET ORAL at 05:36

## 2023-04-03 RX ADMIN — SODIUM CHLORIDE: 9 INJECTION, SOLUTION INTRAVENOUS at 08:38

## 2023-04-03 RX ADMIN — HALOPERIDOL LACTATE 2 MG: 5 INJECTION, SOLUTION INTRAMUSCULAR at 23:01

## 2023-04-03 RX ADMIN — MEROPENEM 1000 MG: 1 INJECTION, POWDER, FOR SOLUTION INTRAVENOUS at 05:36

## 2023-04-03 RX ADMIN — MORPHINE SULFATE 2 MG: 2 INJECTION, SOLUTION INTRAMUSCULAR; INTRAVENOUS at 09:55

## 2023-04-03 RX ADMIN — MIRTAZAPINE 15 MG: 15 TABLET, ORALLY DISINTEGRATING ORAL at 22:34

## 2023-04-03 RX ADMIN — OXYCODONE HYDROCHLORIDE AND ACETAMINOPHEN 1 TABLET: 5; 325 TABLET ORAL at 19:47

## 2023-04-03 RX ADMIN — MORPHINE SULFATE 2 MG: 2 INJECTION, SOLUTION INTRAMUSCULAR; INTRAVENOUS at 21:31

## 2023-04-03 ASSESSMENT — PAIN - FUNCTIONAL ASSESSMENT: PAIN_FUNCTIONAL_ASSESSMENT: PREVENTS OR INTERFERES SOME ACTIVE ACTIVITIES AND ADLS

## 2023-04-03 ASSESSMENT — PAIN DESCRIPTION - LOCATION: LOCATION: HIP

## 2023-04-03 ASSESSMENT — PAIN DESCRIPTION - ORIENTATION: ORIENTATION: RIGHT

## 2023-04-03 ASSESSMENT — PAIN SCALES - GENERAL: PAINLEVEL_OUTOF10: 10

## 2023-04-03 NOTE — ED PROVIDER NOTES
04/02/23 1824 04/02/23 1824 04/02/23 1824 04/02/23 1824   138/88 97.6 °F (36.4 °C) Temporal (!) 110 18 93 % 5' 2\" (1.575 m) 140 lb (63.5 kg)       Physical Exam  Vitals and nursing note reviewed. Constitutional:       General: She is not in acute distress. Appearance: Normal appearance. She is normal weight. She is not ill-appearing, toxic-appearing or diaphoretic. HENT:      Head: Normocephalic and atraumatic. Eyes:      Extraocular Movements: Extraocular movements intact. Pupils: Pupils are equal, round, and reactive to light. Cardiovascular:      Rate and Rhythm: Normal rate and regular rhythm. Pulses: Normal pulses. Pulmonary:      Effort: Pulmonary effort is normal. No respiratory distress. Chest:      Chest wall: No tenderness. Abdominal:      General: There is no distension. Palpations: Abdomen is soft. Tenderness: There is no abdominal tenderness. Musculoskeletal:      Cervical back: Normal range of motion and neck supple. No swelling, deformity, tenderness or bony tenderness. No pain with movement. Normal range of motion. Thoracic back: No swelling, deformity, tenderness or bony tenderness. Normal range of motion. Lumbar back: No swelling, deformity, tenderness or bony tenderness. Normal range of motion. Right hip: Deformity (leg shortened), tenderness and bony tenderness present. Decreased range of motion. Right upper leg: Tenderness and bony tenderness present. No swelling or deformity. Right knee: No swelling, deformity or bony tenderness. Normal range of motion. No tenderness. Normal alignment. Normal pulse. Right lower leg: No swelling, deformity, tenderness or bony tenderness. Right ankle: No swelling or deformity. No tenderness. Normal range of motion. Normal pulse. Right foot: Normal range of motion and normal capillary refill. No swelling, deformity, tenderness or bony tenderness. Normal pulse.       Comments: Bilateral

## 2023-04-03 NOTE — CONSULTS
Relation Age of Onset    Colon Cancer Neg Hx     Colon Polyps Neg Hx     Esophageal Cancer Neg Hx     Liver Disease Neg Hx     Liver Cancer Neg Hx     Stomach Cancer Neg Hx     Rectal Cancer Neg Hx        REVIEW OF SYSTEMS:    Unable to obtain due to dementia    PHYSICAL EXAM:    Vitals:   Vitals:    04/03/23 0735 04/03/23 0738 04/03/23 0756 04/03/23 0955   BP: (!) 95/51 (!) 115/58     Pulse: 65      Resp: 16   18   Temp: 98.6 °F (37 °C)      TempSrc: Temporal      SpO2: 91%  96%    Weight:       Height:         General:  Appears stated age, no distress. Orientation:  Alert and oriented to time, place, and person. Mood and Affect:  Cooperative and pleasant. Gait:  Resting comfortably in bed. Cardiovascular:  Symmetric 1-2 plus pulses in upper and lower extremities. Lymph:  No cervical or inguinal lymphadenopathy noted. Sensation:  Grossly intact to light touch. DTR:  Normal, no pathologic reflexes. Coordination/balance:  Normal    Musculoskeletal:  Right upper extremity exam:  There is no tenderness to palpation about the shoulder, elbow, wrist or hand. Unrestricted full function motion is present. Stability is normal with provocative tests, 5/5 strength, and skin is normal.      Left upper extremity exam:  There is no tenderness to palpation about the shoulder, elbow, wrist or hand. Unrestricted full function motion is present. Stability is normal with provocative tests, 5/5 strength, and skin is normal.     Right lower extremity exam: Tenderness palpation right lateral side of her hip with pain with motion. Left lower extremity exam:  There is no tenderness to palpation about the hip, knee, ankle or foot. Unrestricted full function motion is present.   Stability is normal with provocative tests, 5/5 strength, and skin is normal.      DATA:    CBC with Differential:    Lab Results   Component Value Date/Time    WBC 7.9 04/03/2023 02:00 AM    RBC 4.40 04/03/2023 02:00 AM    HGB 12.8 04/03/2023 02:00

## 2023-04-03 NOTE — H&P
Frontal view of the pelvis. COMPARISON: 03/14/2023 FINDINGS: Bones/joints: Unremarkable. No acute fracture. No dislocation. Soft tissues: Unremarkable. Normal pelvis x-ray. XR FEMUR RIGHT (MIN 2 VIEWS)    Result Date: 4/3/2023  EXAM: XR RIGHT FEMUR, 2 VIEWS CLINICAL INDICATION: Right leg pain TECHNIQUE: Frontal and lateral views of the right femur. COMPARISON: None FINDINGS: Bones/joints: Minimally displaced right greater trochanteric fracture. Right total knee arthroplasty. Components appear well seated. No dislocation. Soft tissues: Unremarkable. Minimally displaced right greater trochanteric fracture. Assessment/Plan:  Principal Problem:    Hip fx, right, closed, initial encounter [facility])  Resolved Problems:    * No resolved hospital problems. *     Principal Problem:    Right hip fracture/Dementia  -ortho consult  -neurovascular checks qshif  -npo after midnight  -pain control  -ct right hip-result pending  -bedrest  -fall precautions  -magnesium  -tsh  -vit b12  -magnesium  -ptt   -I's and O's   -daily weight  Resolved Problems:    * No resolved hospital problems. *  Signed:  YUKI Garber CNP, 4/2/2023 10:01 PM      Attestation Statement     I have independently seen and examined this patient and agree with the asesment and plan by mid level provider        Objective:   Vitals: BP 98/82   Pulse (!) 120   Temp 98.1 °F (36.7 °C)   Resp 16   Ht 5' 2\" (1.575 m)   Wt 140 lb (63.5 kg)   SpO2 92%   BMI 25.61 kg/m²   General appearance: lethargic  Skin: Skin color, texture, turgor normal.   HEENT: Head: Normocephalic, no lesions, without obvious abnormality.   Neck: no adenopathy, no carotid bruit, no JVD, and supple, symmetrical, trachea midline  Lungs: clear to auscultation bilaterally  Heart: regular rate and rhythm, S1, S2 normal, no murmur, click, rub or gallop  Abdomen: soft, non-tender; bowel sounds normal; no masses,  no organomegaly  Extremities: right leg rotated  Lymphatic: No

## 2023-04-04 ENCOUNTER — APPOINTMENT (OUTPATIENT)
Dept: CT IMAGING | Age: 73
End: 2023-04-04
Payer: MEDICARE

## 2023-04-04 LAB
ALBUMIN SERPL-MCNC: 3.3 G/DL (ref 3.5–5.2)
ALP SERPL-CCNC: 109 U/L (ref 35–104)
ALT SERPL-CCNC: 11 U/L (ref 5–33)
ANION GAP SERPL CALCULATED.3IONS-SCNC: 12 MMOL/L (ref 7–19)
AST SERPL-CCNC: 13 U/L (ref 5–32)
BILIRUB SERPL-MCNC: 0.7 MG/DL (ref 0.2–1.2)
BUN SERPL-MCNC: 8 MG/DL (ref 8–23)
CALCIUM SERPL-MCNC: 8.5 MG/DL (ref 8.8–10.2)
CHLORIDE SERPL-SCNC: 109 MMOL/L (ref 98–111)
CO2 SERPL-SCNC: 24 MMOL/L (ref 22–29)
CREAT SERPL-MCNC: 1 MG/DL (ref 0.5–0.9)
EKG P AXIS: -13 DEGREES
EKG P-R INTERVAL: 142 MS
EKG Q-T INTERVAL: 330 MS
EKG QRS DURATION: 90 MS
EKG QTC CALCULATION (BAZETT): 410 MS
EKG T AXIS: 3 DEGREES
ERYTHROCYTE [DISTWIDTH] IN BLOOD BY AUTOMATED COUNT: 15.6 % (ref 11.5–14.5)
GLUCOSE SERPL-MCNC: 90 MG/DL (ref 74–109)
HCT VFR BLD AUTO: 40.5 % (ref 37–47)
HGB BLD-MCNC: 12.8 G/DL (ref 12–16)
MCH RBC QN AUTO: 29 PG (ref 27–31)
MCHC RBC AUTO-ENTMCNC: 31.6 G/DL (ref 33–37)
MCV RBC AUTO: 91.6 FL (ref 81–99)
PLATELET # BLD AUTO: 179 K/UL (ref 130–400)
PMV BLD AUTO: 11.8 FL (ref 9.4–12.3)
POTASSIUM SERPL-SCNC: 3.3 MMOL/L (ref 3.5–5)
PROT SERPL-MCNC: 6.2 G/DL (ref 6.6–8.7)
RBC # BLD AUTO: 4.42 M/UL (ref 4.2–5.4)
SODIUM SERPL-SCNC: 145 MMOL/L (ref 136–145)
WBC # BLD AUTO: 7.2 K/UL (ref 4.8–10.8)

## 2023-04-04 PROCEDURE — 6370000000 HC RX 637 (ALT 250 FOR IP): Performed by: NURSE PRACTITIONER

## 2023-04-04 PROCEDURE — 85027 COMPLETE CBC AUTOMATED: CPT

## 2023-04-04 PROCEDURE — 2580000003 HC RX 258: Performed by: HOSPITALIST

## 2023-04-04 PROCEDURE — 36415 COLL VENOUS BLD VENIPUNCTURE: CPT

## 2023-04-04 PROCEDURE — 97530 THERAPEUTIC ACTIVITIES: CPT

## 2023-04-04 PROCEDURE — 6360000004 HC RX CONTRAST MEDICATION: Performed by: NURSE PRACTITIONER

## 2023-04-04 PROCEDURE — 6360000002 HC RX W HCPCS: Performed by: HOSPITALIST

## 2023-04-04 PROCEDURE — 94760 N-INVAS EAR/PLS OXIMETRY 1: CPT

## 2023-04-04 PROCEDURE — 2700000000 HC OXYGEN THERAPY PER DAY

## 2023-04-04 PROCEDURE — 6360000002 HC RX W HCPCS: Performed by: NURSE PRACTITIONER

## 2023-04-04 PROCEDURE — 80053 COMPREHEN METABOLIC PANEL: CPT

## 2023-04-04 PROCEDURE — 92610 EVALUATE SWALLOWING FUNCTION: CPT

## 2023-04-04 PROCEDURE — 93010 ELECTROCARDIOGRAM REPORT: CPT | Performed by: INTERNAL MEDICINE

## 2023-04-04 PROCEDURE — 6370000000 HC RX 637 (ALT 250 FOR IP): Performed by: HOSPITALIST

## 2023-04-04 PROCEDURE — 92522 EVALUATE SPEECH PRODUCTION: CPT

## 2023-04-04 PROCEDURE — 71275 CT ANGIOGRAPHY CHEST: CPT

## 2023-04-04 PROCEDURE — 1210000000 HC MED SURG R&B

## 2023-04-04 RX ORDER — POTASSIUM CHLORIDE 7.45 MG/ML
10 INJECTION INTRAVENOUS PRN
Status: DISCONTINUED | OUTPATIENT
Start: 2023-04-04 | End: 2023-04-07 | Stop reason: HOSPADM

## 2023-04-04 RX ORDER — LORAZEPAM 2 MG/ML
0.5 INJECTION INTRAMUSCULAR EVERY 6 HOURS PRN
Status: DISCONTINUED | OUTPATIENT
Start: 2023-04-04 | End: 2023-04-06

## 2023-04-04 RX ORDER — POTASSIUM CHLORIDE 20 MEQ/1
40 TABLET, EXTENDED RELEASE ORAL PRN
Status: DISCONTINUED | OUTPATIENT
Start: 2023-04-04 | End: 2023-04-07 | Stop reason: HOSPADM

## 2023-04-04 RX ADMIN — LORAZEPAM 0.5 MG: 2 INJECTION INTRAMUSCULAR; INTRAVENOUS at 22:24

## 2023-04-04 RX ADMIN — SODIUM CHLORIDE, PRESERVATIVE FREE 10 ML: 5 INJECTION INTRAVENOUS at 10:00

## 2023-04-04 RX ADMIN — TRAMADOL HYDROCHLORIDE 50 MG: 50 TABLET, COATED ORAL at 21:06

## 2023-04-04 RX ADMIN — IOPAMIDOL 70 ML: 755 INJECTION, SOLUTION INTRAVENOUS at 14:16

## 2023-04-04 RX ADMIN — SODIUM CHLORIDE: 9 INJECTION, SOLUTION INTRAVENOUS at 10:58

## 2023-04-04 RX ADMIN — ASPIRIN 81 MG: 81 TABLET, CHEWABLE ORAL at 08:08

## 2023-04-04 RX ADMIN — MEROPENEM 1000 MG: 1 INJECTION, POWDER, FOR SOLUTION INTRAVENOUS at 06:12

## 2023-04-04 RX ADMIN — MIRTAZAPINE 15 MG: 15 TABLET, ORALLY DISINTEGRATING ORAL at 21:06

## 2023-04-04 RX ADMIN — TRAZODONE HYDROCHLORIDE 50 MG: 50 TABLET ORAL at 21:06

## 2023-04-04 RX ADMIN — TRAMADOL HYDROCHLORIDE 50 MG: 50 TABLET, COATED ORAL at 15:22

## 2023-04-04 RX ADMIN — POLYETHYLENE GLYCOL 3350 17 G: 17 POWDER, FOR SOLUTION ORAL at 08:09

## 2023-04-04 RX ADMIN — LORAZEPAM 0.5 MG: 2 INJECTION INTRAMUSCULAR; INTRAVENOUS at 14:19

## 2023-04-04 RX ADMIN — POTASSIUM CHLORIDE 40 MEQ: 1500 TABLET, EXTENDED RELEASE ORAL at 11:02

## 2023-04-04 RX ADMIN — ENOXAPARIN SODIUM 40 MG: 100 INJECTION SUBCUTANEOUS at 08:09

## 2023-04-04 RX ADMIN — TRAMADOL HYDROCHLORIDE 50 MG: 50 TABLET, COATED ORAL at 00:47

## 2023-04-04 ASSESSMENT — PAIN DESCRIPTION - LOCATION: LOCATION: HIP

## 2023-04-04 ASSESSMENT — PAIN DESCRIPTION - DESCRIPTORS: DESCRIPTORS: ACHING

## 2023-04-04 ASSESSMENT — PAIN DESCRIPTION - PAIN TYPE: TYPE: ACUTE PAIN

## 2023-04-04 ASSESSMENT — PAIN - FUNCTIONAL ASSESSMENT: PAIN_FUNCTIONAL_ASSESSMENT: PREVENTS OR INTERFERES SOME ACTIVE ACTIVITIES AND ADLS

## 2023-04-04 ASSESSMENT — PAIN DESCRIPTION - ORIENTATION: ORIENTATION: RIGHT

## 2023-04-04 ASSESSMENT — PAIN SCALES - WONG BAKER: WONGBAKER_NUMERICALRESPONSE: 8

## 2023-04-04 NOTE — CARE COORDINATION
PEGGY spoke with Pt daughter Charlie Null over the phone to discuss DC planning options. Charlie Null requested a referral to [facility]. PEGGY sent the requested referral information. Pt insurance requires prior authorization if/when accepted.   [facility] ([facility])  Phone: [phone]  Fax: [phone]  Electronically signed by Colleen Choi on 4/4/2023 at 1:07 PM

## 2023-04-05 LAB
ALBUMIN SERPL-MCNC: 3.4 G/DL (ref 3.5–5.2)
ALP SERPL-CCNC: 112 U/L (ref 35–104)
ALT SERPL-CCNC: 12 U/L (ref 5–33)
ANION GAP SERPL CALCULATED.3IONS-SCNC: 10 MMOL/L (ref 7–19)
AST SERPL-CCNC: 17 U/L (ref 5–32)
BACTERIA UR CULT: ABNORMAL
BACTERIA UR CULT: ABNORMAL
BILIRUB SERPL-MCNC: 0.7 MG/DL (ref 0.2–1.2)
BUN SERPL-MCNC: 6 MG/DL (ref 8–23)
CALCIUM SERPL-MCNC: 9.1 MG/DL (ref 8.8–10.2)
CHLORIDE SERPL-SCNC: 106 MMOL/L (ref 98–111)
CO2 SERPL-SCNC: 27 MMOL/L (ref 22–29)
CREAT SERPL-MCNC: 0.9 MG/DL (ref 0.5–0.9)
ERYTHROCYTE [DISTWIDTH] IN BLOOD BY AUTOMATED COUNT: 15.4 % (ref 11.5–14.5)
GLUCOSE SERPL-MCNC: 106 MG/DL (ref 74–109)
HCT VFR BLD AUTO: 41 % (ref 37–47)
HGB BLD-MCNC: 13.3 G/DL (ref 12–16)
MCH RBC QN AUTO: 29.4 PG (ref 27–31)
MCHC RBC AUTO-ENTMCNC: 32.4 G/DL (ref 33–37)
MCV RBC AUTO: 90.7 FL (ref 81–99)
ORGANISM: ABNORMAL
PLATELET # BLD AUTO: 192 K/UL (ref 130–400)
PMV BLD AUTO: 11.9 FL (ref 9.4–12.3)
POTASSIUM SERPL-SCNC: 3.7 MMOL/L (ref 3.5–5)
PROT SERPL-MCNC: 7.1 G/DL (ref 6.6–8.7)
RBC # BLD AUTO: 4.52 M/UL (ref 4.2–5.4)
SODIUM SERPL-SCNC: 143 MMOL/L (ref 136–145)
WBC # BLD AUTO: 7.3 K/UL (ref 4.8–10.8)

## 2023-04-05 PROCEDURE — 6370000000 HC RX 637 (ALT 250 FOR IP): Performed by: NURSE PRACTITIONER

## 2023-04-05 PROCEDURE — 6360000002 HC RX W HCPCS: Performed by: HOSPITALIST

## 2023-04-05 PROCEDURE — 2700000000 HC OXYGEN THERAPY PER DAY

## 2023-04-05 PROCEDURE — 2580000003 HC RX 258: Performed by: HOSPITALIST

## 2023-04-05 PROCEDURE — 94760 N-INVAS EAR/PLS OXIMETRY 1: CPT

## 2023-04-05 PROCEDURE — 80053 COMPREHEN METABOLIC PANEL: CPT

## 2023-04-05 PROCEDURE — 36415 COLL VENOUS BLD VENIPUNCTURE: CPT

## 2023-04-05 PROCEDURE — 1210000000 HC MED SURG R&B

## 2023-04-05 PROCEDURE — 85027 COMPLETE CBC AUTOMATED: CPT

## 2023-04-05 PROCEDURE — 6370000000 HC RX 637 (ALT 250 FOR IP): Performed by: HOSPITALIST

## 2023-04-05 PROCEDURE — 6360000002 HC RX W HCPCS: Performed by: NURSE PRACTITIONER

## 2023-04-05 RX ORDER — QUETIAPINE FUMARATE 25 MG/1
25 TABLET, FILM COATED ORAL NIGHTLY
Status: DISCONTINUED | OUTPATIENT
Start: 2023-04-05 | End: 2023-04-06

## 2023-04-05 RX ADMIN — ENOXAPARIN SODIUM 40 MG: 100 INJECTION SUBCUTANEOUS at 13:52

## 2023-04-05 RX ADMIN — TRAMADOL HYDROCHLORIDE 50 MG: 50 TABLET, COATED ORAL at 20:08

## 2023-04-05 RX ADMIN — TRAMADOL HYDROCHLORIDE 50 MG: 50 TABLET, COATED ORAL at 14:22

## 2023-04-05 RX ADMIN — SODIUM CHLORIDE, PRESERVATIVE FREE 10 ML: 5 INJECTION INTRAVENOUS at 13:57

## 2023-04-05 RX ADMIN — LORAZEPAM 0.5 MG: 2 INJECTION INTRAMUSCULAR; INTRAVENOUS at 06:09

## 2023-04-05 RX ADMIN — HALOPERIDOL LACTATE 2 MG: 5 INJECTION, SOLUTION INTRAMUSCULAR at 03:50

## 2023-04-05 RX ADMIN — ASPIRIN 81 MG: 81 TABLET, CHEWABLE ORAL at 13:52

## 2023-04-05 RX ADMIN — QUETIAPINE FUMARATE 25 MG: 25 TABLET ORAL at 20:08

## 2023-04-05 RX ADMIN — MIRTAZAPINE 15 MG: 15 TABLET, ORALLY DISINTEGRATING ORAL at 20:08

## 2023-04-05 RX ADMIN — LORAZEPAM 0.5 MG: 2 INJECTION INTRAMUSCULAR; INTRAVENOUS at 23:11

## 2023-04-05 RX ADMIN — POLYETHYLENE GLYCOL 3350 17 G: 17 POWDER, FOR SOLUTION ORAL at 13:52

## 2023-04-05 ASSESSMENT — PAIN DESCRIPTION - DESCRIPTORS: DESCRIPTORS: DISCOMFORT

## 2023-04-05 ASSESSMENT — PAIN SCALES - GENERAL
PAINLEVEL_OUTOF10: 8
PAINLEVEL_OUTOF10: 9
PAINLEVEL_OUTOF10: 1

## 2023-04-05 ASSESSMENT — PAIN - FUNCTIONAL ASSESSMENT: PAIN_FUNCTIONAL_ASSESSMENT: PREVENTS OR INTERFERES SOME ACTIVE ACTIVITIES AND ADLS

## 2023-04-05 ASSESSMENT — PAIN DESCRIPTION - LOCATION: LOCATION: HIP

## 2023-04-05 ASSESSMENT — PAIN DESCRIPTION - ORIENTATION: ORIENTATION: RIGHT

## 2023-04-05 NOTE — CARE COORDINATION
SW spoke with Pt daughter Eduar Hernandez and she also requested a referral to [facility].  Awaiting approval/denial.  [facility]   H  [phone] F  Electronically signed by Danny Bradley on 4/5/2023 at 7:45 AM

## 2023-04-05 NOTE — CARE COORDINATION
[facility] is unable to accept Pt referral at this time. [facility] is able to accept and Pt will need insurance pre-cert if family accepts the bed offer.  SW has reached out to family and updated them on the status of the referral.   [facility]   S  [phone] F  Electronically signed by Idania Martinez on 4/5/2023 at 4:02 PM

## 2023-04-06 PROCEDURE — 97535 SELF CARE MNGMENT TRAINING: CPT

## 2023-04-06 PROCEDURE — 6360000002 HC RX W HCPCS: Performed by: HOSPITALIST

## 2023-04-06 PROCEDURE — 6370000000 HC RX 637 (ALT 250 FOR IP): Performed by: HOSPITALIST

## 2023-04-06 PROCEDURE — 6370000000 HC RX 637 (ALT 250 FOR IP): Performed by: NURSE PRACTITIONER

## 2023-04-06 PROCEDURE — 97530 THERAPEUTIC ACTIVITIES: CPT

## 2023-04-06 PROCEDURE — 1210000000 HC MED SURG R&B

## 2023-04-06 PROCEDURE — 94760 N-INVAS EAR/PLS OXIMETRY 1: CPT

## 2023-04-06 PROCEDURE — 2580000003 HC RX 258: Performed by: HOSPITALIST

## 2023-04-06 RX ORDER — QUETIAPINE FUMARATE 25 MG/1
25 TABLET, FILM COATED ORAL 2 TIMES DAILY
Status: DISCONTINUED | OUTPATIENT
Start: 2023-04-06 | End: 2023-04-07 | Stop reason: HOSPADM

## 2023-04-06 RX ORDER — CLOTRIMAZOLE 1 %
CREAM (GRAM) TOPICAL 2 TIMES DAILY
Status: DISCONTINUED | OUTPATIENT
Start: 2023-04-06 | End: 2023-04-07 | Stop reason: HOSPADM

## 2023-04-06 RX ADMIN — ACETAMINOPHEN 650 MG: 325 TABLET ORAL at 08:06

## 2023-04-06 RX ADMIN — TRAMADOL HYDROCHLORIDE 50 MG: 50 TABLET, COATED ORAL at 08:06

## 2023-04-06 RX ADMIN — ACETAMINOPHEN 650 MG: 325 TABLET ORAL at 14:27

## 2023-04-06 RX ADMIN — QUETIAPINE FUMARATE 25 MG: 25 TABLET ORAL at 20:29

## 2023-04-06 RX ADMIN — TRAMADOL HYDROCHLORIDE 50 MG: 50 TABLET, COATED ORAL at 14:27

## 2023-04-06 RX ADMIN — SODIUM CHLORIDE, PRESERVATIVE FREE 10 ML: 5 INJECTION INTRAVENOUS at 08:05

## 2023-04-06 RX ADMIN — TRAMADOL HYDROCHLORIDE 50 MG: 50 TABLET, COATED ORAL at 20:29

## 2023-04-06 RX ADMIN — MIRTAZAPINE 15 MG: 15 TABLET, ORALLY DISINTEGRATING ORAL at 20:29

## 2023-04-06 RX ADMIN — POLYETHYLENE GLYCOL 3350 17 G: 17 POWDER, FOR SOLUTION ORAL at 08:06

## 2023-04-06 RX ADMIN — ASPIRIN 81 MG: 81 TABLET, CHEWABLE ORAL at 08:06

## 2023-04-06 RX ADMIN — QUETIAPINE FUMARATE 25 MG: 25 TABLET ORAL at 11:33

## 2023-04-06 RX ADMIN — ENOXAPARIN SODIUM 40 MG: 100 INJECTION SUBCUTANEOUS at 08:06

## 2023-04-06 ASSESSMENT — PAIN DESCRIPTION - LOCATION
LOCATION: HIP
LOCATION: HIP

## 2023-04-06 ASSESSMENT — PAIN SCALES - WONG BAKER
WONGBAKER_NUMERICALRESPONSE: 6
WONGBAKER_NUMERICALRESPONSE: 6

## 2023-04-06 ASSESSMENT — PAIN DESCRIPTION - PAIN TYPE
TYPE: ACUTE PAIN
TYPE: ACUTE PAIN

## 2023-04-06 ASSESSMENT — PAIN DESCRIPTION - ORIENTATION
ORIENTATION: RIGHT
ORIENTATION: RIGHT

## 2023-04-06 ASSESSMENT — PAIN DESCRIPTION - DESCRIPTORS
DESCRIPTORS: PATIENT UNABLE TO DESCRIBE
DESCRIPTORS: PATIENT UNABLE TO DESCRIBE

## 2023-04-06 ASSESSMENT — PAIN - FUNCTIONAL ASSESSMENT
PAIN_FUNCTIONAL_ASSESSMENT: PREVENTS OR INTERFERES SOME ACTIVE ACTIVITIES AND ADLS
PAIN_FUNCTIONAL_ASSESSMENT: PREVENTS OR INTERFERES SOME ACTIVE ACTIVITIES AND ADLS

## 2023-04-06 ASSESSMENT — PAIN DESCRIPTION - FREQUENCY
FREQUENCY: CONTINUOUS
FREQUENCY: CONTINUOUS

## 2023-04-06 ASSESSMENT — PAIN SCALES - GENERAL: PAINLEVEL_OUTOF10: 5

## 2023-04-07 VITALS
DIASTOLIC BLOOD PRESSURE: 73 MMHG | SYSTOLIC BLOOD PRESSURE: 98 MMHG | RESPIRATION RATE: 16 BRPM | BODY MASS INDEX: 25.76 KG/M2 | WEIGHT: 140 LBS | HEART RATE: 107 BPM | HEIGHT: 62 IN | TEMPERATURE: 97.2 F | OXYGEN SATURATION: 94 %

## 2023-04-07 LAB — SARS-COV-2 RDRP RESP QL NAA+PROBE: NOT DETECTED

## 2023-04-07 PROCEDURE — 97535 SELF CARE MNGMENT TRAINING: CPT

## 2023-04-07 PROCEDURE — 6370000000 HC RX 637 (ALT 250 FOR IP): Performed by: HOSPITALIST

## 2023-04-07 PROCEDURE — 6360000002 HC RX W HCPCS: Performed by: HOSPITALIST

## 2023-04-07 PROCEDURE — 87635 SARS-COV-2 COVID-19 AMP PRB: CPT

## 2023-04-07 PROCEDURE — 6370000000 HC RX 637 (ALT 250 FOR IP): Performed by: NURSE PRACTITIONER

## 2023-04-07 PROCEDURE — 94760 N-INVAS EAR/PLS OXIMETRY 1: CPT

## 2023-04-07 PROCEDURE — 97530 THERAPEUTIC ACTIVITIES: CPT

## 2023-04-07 RX ORDER — TRAMADOL HYDROCHLORIDE 50 MG/1
50 TABLET ORAL EVERY 6 HOURS PRN
Qty: 20 TABLET | Refills: 0 | Status: SHIPPED | OUTPATIENT
Start: 2023-04-07 | End: 2023-04-12

## 2023-04-07 RX ORDER — QUETIAPINE FUMARATE 25 MG/1
25 TABLET, FILM COATED ORAL 2 TIMES DAILY
Qty: 60 TABLET | Refills: 0 | Status: SHIPPED | OUTPATIENT
Start: 2023-04-07 | End: 2023-04-07 | Stop reason: SDUPTHER

## 2023-04-07 RX ORDER — QUETIAPINE FUMARATE 25 MG/1
25 TABLET, FILM COATED ORAL 2 TIMES DAILY
Qty: 60 TABLET | Refills: 0 | Status: SHIPPED | OUTPATIENT
Start: 2023-04-07

## 2023-04-07 RX ADMIN — CLOTRIMAZOLE: 0.01 CREAM TOPICAL at 09:40

## 2023-04-07 RX ADMIN — ACETAMINOPHEN 650 MG: 325 TABLET ORAL at 15:11

## 2023-04-07 RX ADMIN — QUETIAPINE FUMARATE 25 MG: 25 TABLET ORAL at 09:33

## 2023-04-07 RX ADMIN — TRAMADOL HYDROCHLORIDE 50 MG: 50 TABLET, COATED ORAL at 15:11

## 2023-04-07 RX ADMIN — POLYETHYLENE GLYCOL 3350 17 G: 17 POWDER, FOR SOLUTION ORAL at 09:34

## 2023-04-07 RX ADMIN — ENOXAPARIN SODIUM 40 MG: 100 INJECTION SUBCUTANEOUS at 09:34

## 2023-04-07 RX ADMIN — CLOTRIMAZOLE: 0.01 CREAM TOPICAL at 03:00

## 2023-04-07 RX ADMIN — ASPIRIN 81 MG: 81 TABLET, CHEWABLE ORAL at 09:34

## 2023-04-07 ASSESSMENT — PAIN DESCRIPTION - DESCRIPTORS
DESCRIPTORS: PATIENT UNABLE TO DESCRIBE
DESCRIPTORS: ACHING

## 2023-04-07 ASSESSMENT — PAIN - FUNCTIONAL ASSESSMENT
PAIN_FUNCTIONAL_ASSESSMENT: ACTIVITIES ARE NOT PREVENTED
PAIN_FUNCTIONAL_ASSESSMENT: PREVENTS OR INTERFERES SOME ACTIVE ACTIVITIES AND ADLS

## 2023-04-07 ASSESSMENT — PAIN DESCRIPTION - ORIENTATION
ORIENTATION: RIGHT
ORIENTATION: RIGHT

## 2023-04-07 ASSESSMENT — PAIN DESCRIPTION - LOCATION
LOCATION: HIP
LOCATION: HIP

## 2023-04-07 ASSESSMENT — PAIN DESCRIPTION - PAIN TYPE: TYPE: ACUTE PAIN

## 2023-04-07 ASSESSMENT — PAIN SCALES - WONG BAKER: WONGBAKER_NUMERICALRESPONSE: 6

## 2023-04-07 ASSESSMENT — PAIN DESCRIPTION - FREQUENCY: FREQUENCY: CONTINUOUS

## 2023-04-07 ASSESSMENT — PAIN SCALES - GENERAL: PAINLEVEL_OUTOF10: 5

## 2023-04-07 NOTE — CARE COORDINATION
ARIANA attempted to contact Andree Peacock dtr, to sign IMM letter awaiting call back.   Electronically signed by Vaughn Cardenas RN on 4/7/2023 at 9:58 AM

## 2023-04-07 NOTE — DISCHARGE INSTR - DIET
Good nutrition is important when healing from an illness, injury, or surgery. Follow any nutrition recommendations given to you during your hospital stay. If you were given an oral nutrition supplement while in the hospital, continue to take this supplement at home. You can take it with meals, in-between meals, and/or before bedtime. These supplements can be purchased at most local grocery stores, pharmacies, and chain [employer]-stores. If you have any questions about your diet or nutrition, call the hospital and ask for the dietitian. ADULT DIET; Dysphagia - Soft and Bite Sized   meds whole in pudding/applesauce.  Patient may benefit from nutritional supplements

## 2023-04-07 NOTE — CARE COORDINATION
CM faxed copy of 2nd IMM to [employer], dtr/POA. CM answered questions and concerns. Signed copy placed in soft chart. Eduar Mary has kept faxed copy for her records.

## 2023-04-07 NOTE — DISCHARGE SUMMARY
Amount: 200 mg            CONTINUE taking these medications      aspirin 81 MG chewable tablet  Take 1 tablet by mouth daily     traZODone 50 MG tablet  Commonly known as: DESYREL               Where to Get Your Medications        These medications were sent to [facility], [address] [address]      Phone: [phone]   apixaban 5 MG Tabs tablet  QUEtiapine 25 MG tablet  traMADol 50 MG tablet            Discharge Instructions: Take medications as directed. Resume activity as tolerated. Rodo Lr MD  [address]  [phone]    Follow up in 1 week(s)      Yanick Montoya MD  [address]  [phone]    Follow up in 2 week(s)         Diet: ADULT DIET; Dysphagia - Soft and Bite Sized     Disposition: Patient is medically stable and will be discharged to SNF. Time spent on discharge 38 minutes spent in assessing patient, reviewing medications, discussion with nursing, confirming safe discharge plan and preparation of discharge summary. Signed:  Electronically signed by YUKI Armas CNP on 4/7/23 at 10:54 AM CDT         EMR Dragon/Transcription disclaimer:   Much of this encounter note is an electronic transcription/translation of spoken language to printed text.  The electronic translation of spoken language may permit erroneous, or at times, nonsensical words or phrases to be inadvertently transcribed; although attempts have made to review the note for such errors, some may still exist.

## 2023-04-07 NOTE — PROGRESS NOTES
24 hour orders verified
Facility/Department: [facility] SURG SERVICES  Daily Treatment Note  NAME: Kye Jordan  : 1950  MRN: [id number]    Date of Service: 2023    Discharge Recommendations:  Patient would benefit from continued therapy after discharge, 24 hour supervision or assist       Assessment   Assessment: OOB to bathroom toilet with Hernando Moseley then up to recliner. Participated in transfer training and standing balance activities. Treatment Diagnosis: Right greater trochanter fx, hx of dementia  Activity Tolerance  Activity Tolerance: Patient limited by pain;Treatment limited secondary to decreased cognition         Patient Diagnosis(es): The primary encounter diagnosis was Closed nondisplaced fracture of greater trochanter of right femur, initial encounter ([facility] [address] A diagnosis of Hip fx, right, closed, initial encounter [facility]) was also pertinent to this visit. has a past medical history of Anemia, Anxiety, Arthritis, Cobalamin deficiency, Depression, Fatigue, Hyperlipidemia, Hypertension, and Insomnia. has a past surgical history that includes knee surgery and Colonoscopy (N/A, 2016). Restrictions  Restrictions/Precautions  Restrictions/Precautions: Fall Risk, Weight Bearing  Lower Extremity Weight Bearing Restrictions  Right Lower Extremity Weight Bearing: Weight Bearing As Tolerated  Position Activity Restriction  Other position/activity restrictions: no surgical intervention planned    Subjective   General  Chart Reviewed: Yes  Patient assessed for rehabilitation services?: Yes  Response to previous treatment: Patient with no complaints from previous session  Family / Caregiver Present: Yes  Pre Treatment Pain Screening  Pain at present: 2  Scale Used: Faces  Intervention List: Patient able to continue with treatment  Comments / Details: Per faces 4/10 transiently during certain movements. Positioned for comfort end of session-no distress noted.  Discussed pain with nurse     Objective    ADL  Toileting:
Facility/Department: [facility] SURG SERVICES  Daily Treatment Note  NAME: Roxanna Raymond  : 1950  MRN: [id number]    Date of Service: 2023    Discharge Recommendations:  Patient would benefit from continued therapy after discharge       Assessment   Assessment: OOB to [facility] and to recliner. Participated in grooming and dressing tasks. Treatment Diagnosis: Right greater trochanter fx, hx of dementia  Activity Tolerance  Activity Tolerance: Patient limited by pain;Treatment limited secondary to decreased cognition         Patient Diagnosis(es): The encounter diagnosis was Closed nondisplaced fracture of greater trochanter of right femur, initial encounter ([facility] [address] has a past medical history of Anemia, Anxiety, Arthritis, Cobalamin deficiency, Depression, Fatigue, Hyperlipidemia, Hypertension, and Insomnia. has a past surgical history that includes knee surgery and Colonoscopy (N/A, 2016). Restrictions  Restrictions/Precautions  Restrictions/Precautions: Fall Risk, Weight Bearing  Lower Extremity Weight Bearing Restrictions  Right Lower Extremity Weight Bearing: Weight Bearing As Tolerated  Position Activity Restriction  Other position/activity restrictions: no surgical intervention planned    Subjective   General  Chart Reviewed: Yes  Patient assessed for rehabilitation services?: Yes  Response to previous treatment: Patient with no complaints from previous session  Family / Caregiver Present: Yes (son)  Pre Treatment Pain Screening  Pain at present: 6  Scale Used: Faces  Intervention List: Patient able to continue with treatment  Comments / Details: Per faces 8/10 transiently during certain movments, then calms again. Final positioning for comfort, heels off loaded. Objective    ADL  Feeding: Moderate assistance  Grooming: Minimal assistance (combing hair and washing face)  UE Bathing: Maximum assistance  LE Bathing: Maximum assistance  UE Dressing: Minimal assistance; Moderate assistance  LE
Facility/Department: [facility] SURG SERVICES  Daily Treatment Note  NAME: Cheyenne James  : 1950  MRN: [id number]    Date of Service: 2023    Discharge Recommendations:  Patient would benefit from continued therapy after discharge       Assessment   Assessment: Patient needing to go to the bathroom. Assistance provided. Treatment Diagnosis: Right greater trochanter fx, hx of dementia  Activity Tolerance  Activity Tolerance: Patient limited by pain;Treatment limited secondary to decreased cognition         Patient Diagnosis(es): The encounter diagnosis was Closed nondisplaced fracture of greater trochanter of right femur, initial encounter ([facility] [address] has a past medical history of Anemia, Anxiety, Arthritis, Cobalamin deficiency, Depression, Fatigue, Hyperlipidemia, Hypertension, and Insomnia. has a past surgical history that includes knee surgery and Colonoscopy (N/A, 2016). Restrictions  Restrictions/Precautions  Restrictions/Precautions: Fall Risk, Weight Bearing  Lower Extremity Weight Bearing Restrictions  Right Lower Extremity Weight Bearing: Weight Bearing As Tolerated  Position Activity Restriction  Other position/activity restrictions: no surgical intervention planned    Subjective   General  Chart Reviewed: Yes  Patient assessed for rehabilitation services?: Yes  Response to previous treatment: Patient with no complaints from previous session  Family / Caregiver Present: Yes (son)  Pre Treatment Pain Screening  Pain at present: 6  Scale Used: Faces  Intervention List: Patient able to continue with treatment  Comments / Details: Per faces 8/10 transiently during certain movments, then calms again. Final positioning for comfort, heels off loaded. Objective    ADL    Toileting: Moderate assistance;Maximum assistance        Toilet Transfers  Toilet - Technique: Stand step  Toilet Transfer:  Moderate assistance  Toilet Transfers Comments: Requires tactile cues to initiate taking a
Occupational Therapy     04/06/23 1500   Subjective   Subjective Pt sitting up in recliner and agreeable to get back to bed. Son present. Pain Assessment   Pain Assessment Baker-Baker FACES   Baker-Baker Pain Rating 6   Pain Location Hip   Pain Orientation Right   Pain Descriptors Patient unable to describe   Functional Pain Assessment Prevents or interferes some active activities and ADLs   Pain Type Acute pain   Pain Frequency Continuous   Non-Pharmaceutical Pain Intervention(s) Ambulation/Increased Activity;Repositioned; Rest   Response to Pain Intervention Patient satisfied   Cognition   Overall Cognitive Status Exceptions   Cognition Comment Minimally verbalizes. Eyes closed much of the time but is not always sleeping. With structured concrete tasks she is able to be engaged in modified purposeful activity. Orientation   Overall Orientation Status X   Bed Mobility Training   Bed Mobility Training Yes   Overall Level of Assistance Maximum assistance;Assist X2   Interventions Verbal cues; Tactile cues;Manual cues   Sit to Supine Maximum assistance;Assist X2   Scooting Maximum assistance;Assist X2   Transfer Training   Transfer Training Yes   Overall Level of Assistance Maximum assistance;Assist X2   Interventions Verbal cues; Tactile cues;Manual cues   Sit to Stand Maximum assistance;Assist X2   Stand to Sit Maximum assistance;Assist X2   Bed to Chair Maximum assistance;Assist X2   Balance   Sitting Intact   Standing With support   Assessment   Assessment Tx focused on transfer back to bed with recliner close to bed for stand-pivot transfer. Pt requires increased verbal/ tactile cues to completed.    Activity Tolerance Treatment limited secondary to decreased cognition   Discharge Recommendations Patient would benefit from continued therapy after discharge;24 hour supervision or assist   Occupational Therapy Plan   Times Per Week 3-5   Times Per Day Once a day   OT Plan of Care   Thursday X     Electronically
Patient becoming increasingly restless continuously pulling off nasal cannula and de-sating, pulled out IV access, pulling at foy cath. Attempted reorientation, redirection,disguising of equipment with no success. Night Hospitalist made aware of patient's confusion, restlessness, and anxiety.  Electronically signed by Jose Angel Norman RN on 4/4/2023 at 12:22 AM
Physical Therapy  Facility/Department: [facility] SURG SERVICES  Physical Therapy Initial Assessment    Name: Rylie Officer  : 1950  MRN: [id number]  Date of Service: 4/3/2023    Discharge Recommendations:  Continue to assess pending progress, Patient would benefit from continued therapy after discharge (ANTICIAPATE pt [address])          Patient Diagnosis(es): The encounter diagnosis was Closed nondisplaced fracture of greater trochanter of right femur, initial encounter ([facility] [address] Past Medical History:  has a past medical history of Anemia, Anxiety, Arthritis, Cobalamin deficiency, Depression, Fatigue, Hyperlipidemia, Hypertension, and Insomnia. Past Surgical History:  has a past surgical history that includes knee surgery and Colonoscopy (N/A, 2016). Assessment   Body Structures, Functions, Activity Limitations Requiring Skilled Therapeutic Intervention: Decreased functional mobility ; Decreased strength;Decreased safe awareness;Decreased cognition;Decreased endurance;Decreased posture; Increased pain  Assessment: pt WOULD BENEFIT FROM SKILLED PT IN THIS SETTING TO ADDRESS HER MOBILITY DEFICITS POST FALL WITH R NON OP HIP FX  Therapy Prognosis: Fair  Decision Making: Medium Complexity  Requires PT Follow-Up: Yes  Activity Tolerance  Activity Tolerance: Patient tolerated treatment well     Plan   Physcial Therapy Plan  General Plan: 5-7 times per week  Therapy Duration: 2 Weeks  Current Treatment Recommendations: Strengthening, Functional mobility training, Transfer training, Cognitive reorientation, Gait training, Pain management, Safety education & training, Patient/Caregiver education & training, Therapeutic activities, Positioning  Additional Comments: WBAT ON R LE. CONTINUE WITH EOB SITTING AND SIT TO STAND ACTIVITY.  ATTEMPT STANDING IN EITHER SHAMEKA STEDY OR RW AND PROGRESS AS INDICATED  Safety Devices  Type of Devices: Call light within reach, Bed alarm in place, Nurse
Physical Therapy  Name: Ashwini Wells  MRN:  [id number]  Date of service:  4/5/2023 04/05/23 1440   Subjective   Subjective Attempt: No tx at this time per RN, pt has been increasingly confused today and is resting comfortably at this time.          Electronically signed by Taryn Romeo PTA on 4/5/2023 at 2:41 PM
Physical Therapy  Name: Zulma Mcdonough  MRN:  [id number]  Date of service:  4/4/2023 04/04/23 1310   Restrictions/Precautions   Restrictions/Precautions Weight Bearing   Lower Extremity Weight Bearing Restrictions   Right Lower Extremity Weight Bearing Weight Bearing As Tolerated   Subjective   Subjective Pt speaks mostly nonsensical during tx. Pain Assessment   Pain Assessment Baker-Baker FACES   Baker-Baker Pain Rating 8  (with movement)   Pain Location Hip   Pain Orientation Right   Pain Descriptors Aching   Functional Pain Assessment Prevents or interferes some active activities and ADLs   Pain Type Acute pain   Oxygen Therapy   O2 Device Nasal cannula   O2 Flow Rate (L/min) 3 L/min   Bed Mobility   Supine to Sit Maximal assistance   Sit to Supine Maximal assistance   Transfers   Sit to Stand Minimal Assistance; Moderate Assistance   Stand to Sit Minimal Assistance; Moderate Assistance   Comment pt stood 3x from bedside with bedrails and then with rwx, unable to take steps in any direction once standing   Short Term Goals   Time Frame for Short Term Goals 2 WKS   Short Term Goal 1 ROLL R/L MIN A 1   Short Term Goal 2 SUP<>SIT, MIN/MOD A 1   Short Term Goal 3 SIT<>STAND, MIN/MOD A 1   Short Term Goal 4 TF'S WITH RW OR SHAMEKA BENSON AS INDICATED   Conditions Requiring Skilled Therapeutic Intervention   Body Structures, Functions, Activity Limitations Requiring Skilled Therapeutic Intervention Decreased functional mobility ; Decreased strength;Decreased safe awareness;Decreased cognition;Decreased endurance;Decreased posture; Increased pain   Assessment Pt limited d/t cognition, able to stand multiple times but unable to take steps. Pt would liekly be able to TF to recliner via SS per nursing discretion d/t cognition.    Activity Tolerance   Activity Tolerance Patient tolerated treatment well;Patient limited by pain;Treatment limited secondary to decreased cognition   PT Plan of Care   Tuesday X   Safety Devices   Type
Pt becoming increasingly agitated when staff tries to place nasal cannula back into nares, as patient continuously pulls off O2 and desats to 82%. pt is scratching and grabbing at staff, as well as pulling at IV and pulled out foy cath, prn meds given per order. Reorientation, redirection and disguising of equipment attempted with no success, discussed with family the importance of assisting in reminding pt to keep O2 on. Hospitalist made aware of patients increased agitation and pulling out of foy. Patient O2 sat 91% on 3L NC, full linen change at this time, bed alarm on, family at bedside, call light with in reach.   Electronically signed by Claire Jon RN on 4/5/2023 at 4:05 AM
Upon entering the patients room, the patient had ripped out her IV and the fluids were infusing on the floor. There was a large pool of normal saline fluid on the floor. The patient also had her nasal cannula on the floor. Myself and another nurse tried to start an IV with no success. Supa Corado from [facility] was able to start a 20g IV in the patients right arm. The patient has been taken for her CTA pulmonary with contrast with her 3 Liters of oxygen per portable oxygen tank.
3-5  Goals  Short Term Goals  Short Term Goal 1: Peform transfers with min A  Short Term Goal 2: Perform toileting with min A  Short Term Goal 3: Perform dressing with min A  Short Term Goal 4:  Independent with therapeutic activity program.       Therapy Time   Individual Concurrent Group Co-treatment   Time In           Time Out           Minutes              JEWEL Roche  Electronically signed by JEWEL Roche on 4/4/2023 at 2:32 PM
ROS: Dementia      Objective:   VITALS:  BP (!) 150/78 Comment: manual  Pulse 95   Temp 97.6 °F (36.4 °C) (Temporal)   Resp 19   Ht 5' 2\" (1.575 m)   Wt 140 lb (63.5 kg)   SpO2 90%   BMI 25.61 kg/m²   24HR INTAKE/OUTPUT:  No intake or output data in the 24 hours ending 04/06/23 0958        Physical Exam  Constitutional:       General: She is not in acute distress. Appearance: Normal appearance. She is not toxic-appearing or diaphoretic. HENT:      Head: Normocephalic and atraumatic. Right Ear: External ear normal.      Left Ear: External ear normal.      Nose: Nose normal. No congestion or rhinorrhea. Mouth/Throat:      Mouth: Mucous membranes are moist.      Pharynx: Oropharynx is clear. Eyes:      General: No scleral icterus. Extraocular Movements: Extraocular movements intact. Conjunctiva/sclera: Conjunctivae normal.   Cardiovascular:      Rate and Rhythm: Normal rate and regular rhythm. Pulses: Normal pulses. Heart sounds: Normal heart sounds. No murmur heard. No friction rub. No gallop. Pulmonary:      Effort: Pulmonary effort is normal. No respiratory distress. Breath sounds: Normal breath sounds. No wheezing, rhonchi or rales. Abdominal:      General: Abdomen is flat. Bowel sounds are normal. There is no distension. Palpations: Abdomen is soft. Tenderness: There is no abdominal tenderness. Musculoskeletal:         General: No swelling. Normal range of motion. Cervical back: Normal range of motion and neck supple. Right lower leg: No edema. Left lower leg: No edema. Skin:     General: Skin is warm and dry. Coloration: Skin is not jaundiced. Findings: No erythema, lesion or rash. Neurological:      General: No focal deficit present. Mental Status: She is alert. Mental status is at baseline. She is disoriented. Cranial Nerves: No cranial nerve deficit. Sensory: No sensory deficit.       Motor: No
Requires cueing for safety throughout. Assisted to chair with all needs in reach. Activity Tolerance   Activity Tolerance Treatment limited secondary to decreased cognition   PT Plan of Care   Friday X   Safety Devices   Type of Devices Call light within reach; Chair alarm in place; Left in chair  (son present in room)         Electronically signed by Nicol Chavez PTA on 4/7/2023 at 11:36 AM
Thursday X   Safety Devices   Type of Devices Call light within reach; Bed alarm in place; Left in bed       Electronically signed by Delano Chua PTA on 4/6/2023 at 2:43 PM
4:34 PM.
Comments: Sleeping currently           Medications:      sodium chloride 100 mL/hr at 04/04/23 1058    sodium chloride        QUEtiapine  25 mg Oral Nightly    mirtazapine  15 mg Oral Nightly    sodium chloride flush  5-40 mL IntraVENous 2 times per day    enoxaparin  40 mg SubCUTAneous Daily    aspirin  81 mg Oral Daily    polyethylene glycol  17 g Oral Daily     LORazepam, potassium chloride **OR** potassium alternative oral replacement **OR** potassium chloride, traMADol, sodium chloride flush, sodium chloride, ondansetron **OR** ondansetron, polyethylene glycol, acetaminophen **OR** acetaminophen, traZODone, naloxone  ADULT DIET; Dysphagia - Soft and Bite Sized     Lab and other Data:     Recent Labs     04/03/23  0200 04/04/23  0744 04/05/23  0643   WBC 7.9 7.2 7.3   HGB 12.8 12.8 13.3    179 192       Recent Labs     04/03/23  0200 04/04/23  0744 04/05/23  0643    145 143   K 3.9 3.3* 3.7    109 106   CO2 21* 24 27   BUN 11 8 6*   CREATININE 0.9 1.0* 0.9   GLUCOSE 86 90 106       Recent Labs     04/03/23  0200 04/04/23  0744 04/05/23  0643   AST 17 13 17   ALT 13 11 12   BILITOT 0.9 0.7 0.7   ALKPHOS 109* 109* 112*       Troponin T:   Recent Labs     04/03/23  2229   TROPONINI 0.03         INR:   Recent Labs     04/02/23  2140   INR 1.24*       UA:  Recent Labs     04/03/23  0245   COLORU YELLOW   PHUR 6.0   WBCUA 12*   RBCUA 3   BACTERIA NEGATIVE*   CLARITYU Clear   SPECGRAV 1.015   LEUKOCYTESUR SMALL*   UROBILINOGEN 1.0   BILIRUBINUR Negative   BLOODU Negative   GLUCOSEU Negative         RAD:     XR PELVIS (1-2 VIEWS)  Result Date: 4/3/2023    Normal pelvis x-ray. XR FEMUR RIGHT (MIN 2 VIEWS)  Result Date: 4/3/2023    Minimally displaced right greater trochanteric fracture. XR CHEST PORTABLE  Result Date: 4/2/2023    Right basilar atelectasis and low lung volumes. Electronically signed by Jael Osborn M.D. on 04-02-23 at 2355      CT HIP RIGHT WO CONTRAST  Result Date:
Judgment: Judgment normal.          Medications:      sodium chloride 100 mL/hr at 04/03/23 0838    sodium chloride        meropenem  1,000 mg IntraVENous Q8H    sodium chloride flush  5-40 mL IntraVENous 2 times per day    [Held by provider] enoxaparin  40 mg SubCUTAneous Daily    [Held by provider] aspirin  81 mg Oral Daily    polyethylene glycol  17 g Oral Daily     morphine, sodium chloride flush, sodium chloride, ondansetron **OR** ondansetron, polyethylene glycol, acetaminophen **OR** acetaminophen, traZODone, oxyCODONE-acetaminophen, naloxone  Diet NPO Exceptions are: Sips of Water with Meds     Lab and other Data:     Recent Labs     04/02/23 2140 04/03/23  0200   WBC 10.3 7.9   HGB 13.6 12.8    181     Recent Labs     04/02/23 2140 04/03/23  0200    140   K 3.6 3.9    107   CO2 25 21*   BUN 11 11   CREATININE 0.9 0.9   GLUCOSE 118* 86     Recent Labs     04/02/23 2140 04/03/23  0200   AST 18 17   ALT 16 13   BILITOT 1.0 0.9   ALKPHOS 126* 109*     Troponin T: No results for input(s): TROPONINI in the last 72 hours. Pro-BNP: No results for input(s): BNP in the last 72 hours. INR:   Recent Labs     04/02/23 2140   INR 1.24*     UA:  Recent Labs     04/03/23  0245   COLORU YELLOW   PHUR 6.0   WBCUA 12*   RBCUA 3   BACTERIA NEGATIVE*   CLARITYU Clear   SPECGRAV 1.015   LEUKOCYTESUR SMALL*   UROBILINOGEN 1.0   BILIRUBINUR Negative   BLOODU Negative   GLUCOSEU Negative       RAD:     XR PELVIS (1-2 VIEWS)  Result Date: 4/3/2023    Normal pelvis x-ray. XR FEMUR RIGHT (MIN 2 VIEWS)  Result Date: 4/3/2023    Minimally displaced right greater trochanteric fracture. XR CHEST PORTABLE  Result Date: 4/2/2023    Right basilar atelectasis and low lung volumes. Electronically signed by Darci Goetz M.D. on 04-02-23 at 2355      CT HIP RIGHT WO CONTRAST  Result Date: 4/2/2023    Comminuted nondisplaced right greater trochanter fracture without evidence of intertrochanteric
PO intake was reported as low. Will continue to follow.     Electronically signed by DEBRA Maciel on 4/4/2023 at 2:03 PM
greater trochanter fracture without evidence of intertrochanteric extension. Electronically signed by Elysia Pena M.D. on 04-02-23 at 2753        Micro:      Liliam Geeta Salas [[id number]] Collected: 04/02/23 2141   Order Status: Completed Specimen: Nasopharyngeal Swab Updated: 04/02/23 2208    SARS-CoV-2, NAAT Not Detected       Assessment/Plan   Principal Problem:    Hip fx, right, closed, initial encounter [facility])  Resolved Problems:    * No resolved hospital problems. *      Principal Problem:    Hip fx, right, closed, initial encounter ([facility] Dementia-    - Orthopedic surgery following    - Recommended non-surgical treatment with partial weight bearing as tolerated with repeat imaging in 2 weeks    - Pain control    - PT/OT    - Neurovascular checks   - Fall precautions   - Social service assisting with DC planning to SNF        UTI-    - discontinue Merrem    - Urine culture- mixed skin cece    - Monitor I's and O's closely           DVT Prophylaxis: Lovenox      Discharge planning: Social service assisting with DC planning to SNF       Further Orders per Clinical course/attending. Electronically signed by YUKI Mcneill CNP on 4/4/2023 at 1:45 PM       EMR Dragon/Transcription disclaimer:   Much of this encounter note is an electronic transcription/translation of spoken language to printed text.  The electronic translation of spoken language may permit erroneous, or at times, nonsensical words or phrases to be inadvertently transcribed; although attempts have made to review the note for such errors, some may still exist.

## 2023-04-10 NOTE — DISCHARGE INSTRUCTIONS
Return or seek medical attention with increasing or severe pain, fever greater than 100.5, or other concerns. Follow-up with orthopedic surgery as scheduled.

## 2023-04-10 NOTE — ED NOTES
[address] [facility] and gave report to Kesha.       Bigg Bauer RN  04/10/23 8585

## 2023-04-10 NOTE — ED PROVIDER NOTES
dementia and aggressive behavior presents with uncontrolled pain due to right hip fracture. Vital signs are taken and IV is started with the assistance of 3 staff members. IV Dilaudid 0.5 mg and Ativan are given with diminished aggressive behavior. CT scan is ordered of right hip. CT scan of the right hip shows a mildly increased widening between the greater trochanter and femur. No fluid collection or other problems evident. Patient will be discharged to the nursing home. Patient will return with increasing or severe pain, difficulty breathing, or other concerns. CONSULTS:  None    PROCEDURES:  Unless otherwise noted below, none     Procedures    FINAL IMPRESSION      1. Right hip pain    2. Closed displaced fracture of greater trochanter of right femur, initial encounter [facility])          DISPOSITION/PLAN   DISPOSITION Decision To Discharge 04/10/2023 05:25:37 PM      PATIENT REFERRED TO:  MD Samira Chapa [phone] [id number]  [phone]    Schedule an appointment as soon as possible for a visit         DISCHARGE MEDICATIONS:  Discharge Medication List as of 4/10/2023  6:57 PM        START taking these medications    Details   oxyCODONE-acetaminophen (PERCOCET) 5-325 MG per tablet Take 1 tablet by mouth in the morning and 1 tablet at noon and 1 tablet in the evening. Do all this for 3 days. Intended supply: 3 days. Take lowest dose possible to manage pain. Max Daily Amount: 3 tablets. , Disp-9 tablet, R-0Normal                (Please note that portions of this note were completed with a voice recognition program.  Efforts were made to edit thedictations but occasionally words are mis-transcribed.)    Tierney Cooper MD (electronically signed)  Attending Emergency Physician          Tierney Cooper MD  04/11/23 [id number]

## 2023-04-12 PROBLEM — R41.82 ALTERED MENTAL STATUS, UNSPECIFIED: Status: ACTIVE | Noted: 2023-01-01

## 2023-04-12 PROBLEM — N39.0 UTI (URINARY TRACT INFECTION): Status: ACTIVE | Noted: 2023-01-01

## 2023-04-12 NOTE — H&P
Sravanthi Prince - History & Physical      PCP: Dr. Ida Gutierrez M.D., MD    Date of Admission: 4/12/2023    Date of Service: 4/12/2023    Chief Complaint:     Altered mental status    History Of Present Illness: The patient is a 81 y.o. female with PMH of Dementia, hypertension, hyperlipidemia, and anxiety who presented to [facility] with altered mental status. Has a nondisplaced right greater trochanter fracture without evidence of intertrochanteric extension, non surgical.   Patient presents yesterday to the emergency department with uncontrolled pain. Patient in noted to have increased agitation and restlessness. Unable to obtain history from patient, daughter present and stated Seroquel was stopped then restarted at the facility and she feels she has not been acting right since. States she has had diarrhea since admission, will monitor. Unable to get patient to answer any questions. Just frigid in the bed. Rocephin initiated for UTI. Will restart Seroquel. Sitter to monitor patient. Miconazole application times one, then powder to vaginal and ana laura area. OT/PT evaluation and management. Past Medical History:        Diagnosis Date    Anemia     Anxiety     Arthritis     Cobalamin deficiency     Dementia (HCC)     Depression     Fatigue     Hyperlipidemia     Hypertension     Insomnia        Past Surgical History:        Procedure Laterality Date    COLONOSCOPY N/A 5/24/2016    Dr Jorge Dumont AP (-) dysplasia x 1, HP x 3, 5 yr recall    KNEE SURGERY         Home Medications:  Prior to Admission medications    Medication Sig Start Date End Date Taking? Authorizing Provider   oxyCODONE-acetaminophen (PERCOCET) 5-325 MG per tablet Take 1 tablet by mouth in the morning and 1 tablet at noon and 1 tablet in the evening. Do all this for 3 days. Intended supply: 3 days. Take lowest dose possible to manage pain. Max Daily Amount: 3 tablets.  4/10/23 4/13/23

## 2023-04-13 PROBLEM — Z51.5 PALLIATIVE CARE PATIENT: Status: ACTIVE | Noted: 2023-01-01

## 2023-04-13 NOTE — PLAN OF CARE
SUBJECTIVE:    Pt seen with her daughter, our colleague Trevor Velez one of out surgica lucas Rns from recent days  Pt is her Mother, she states that she would want to be DNR  Described that she had effective pain control with Dilaudid 0.5 in ED  She has a hair line Fx of the femur, and this was deemed nonoperative  She suffers from dementia at baseline  She states that she just wants her Mother to be comfortable      OBJECTIVE:    BP (!) 139/91   Pulse 88   Temp 100.2 °F (37.9 °C)   Resp 20   Ht 5' 4\" (1.626 m)   Wt 124 lb (56.2 kg) Comment: bedrest  SpO2 90%   BMI 21.28 kg/m²       ASSESSMENTS & PLANS:    Pain, Agitation, & Restlessness  Daughter will remain at bedside, pt responding to her ore so than PCA, cot to room  Atarax 25mg Q6h PRN agitation or anxiety  Melatonin 5mg PO QHS scheduled and PRN dose also available as needed, atarax may be used PRN for this as well if the melatonin doses fail  DNR code status - as per her family  Palliative care consult  Dialudid 0.5mg IVB x once now  Narcan PRN  Dilaudid 0.25-0.5mg pain scale moderate-severe pain

## 2023-04-13 NOTE — ACP (ADVANCE CARE PLANNING)
Advance Care Planning     Advance Care Planning (ACP) Physician/NP/PA (Provider) Conversation      Date of ACP Conversation: 4/13/2023    Conversation Conducted with:  Pts daughter over telephone    Health Care Decision Maker:  Current Designated Health Care Decision Maker:     Primary Decision Maker: Catherine Chapa - Child - [phone]    Care Preferences:  Ventilation: \"If you were in your present state of health and suddenly became very ill and were unable to breathe on your own, what would your preference be about the use of a ventilator (breathing machine) if it was available to you? \"    NO    Resuscitation:  \"CPR works best to restart the heart when there is a sudden event, like a heart attack, in someone who is otherwise healthy. Unfortunately, CPR does not typically restart the heart for people who have serious health conditions or who are very sick. \"    \"In the event your heart stopped as a result of an underlying serious health condition, would you want attempts to be made to restart your heart (answer \"yes\" for attempt to resuscitate) or would you prefer a natural death (answer \"no\" for do not attempt to resuscitate)? \"   NO    Conversation Outcomes / Follow-Up Plan:   Called pts daughter/POA, Ken Obrien, to update on status and discuss plan of care. Reviewed POA documents on file. Ken Obrien is pts durable POA but is okay with her siblings receiving updates and participating in her care. Family transitioned pt to a DNR code status last night. Initiated discussions regarding hospice care for comfort measures if there is no improvement in status with current medical interventions. Family is hopeful medicine changes made by primary team will also improve symptom burden.      Length of Voluntary ACP Conversation in minutes:  14 minutes during consult visit     Hailey Bacrlay, YUKI - CNP

## 2023-04-13 NOTE — CARE COORDINATION
Case Management Assessment  Initial Evaluation    Date/Time of Evaluation: 4/13/2023 8:57 AM  Assessment Completed by: Jackelyn Renee RN    If patient is discharged prior to next notation, then this note serves as note for discharge by case management. Patient Name: Jt Hinkle                   YOB: 1950  Diagnosis: Altered mental status, unspecified [R41.82]                   Date / Time: 4/12/2023  4:14 PM    Patient Admission Status: Inpatient   Readmission Risk (Low < 19, Mod (19-27), High > 27): Readmission Risk Score: 16.4    Current PCP: Dr. Stuart Couch M.D., MD  PCP verified by CM? Chart Reviewed: Yes      History Provided by:    Patient Orientation:      Patient Cognition:      Hospitalization in the last 30 days (Readmission):  Yes    If yes, Readmission Assessment in  Navigator will be completed. Advance Directives:      Code Status: DNR   Patient's Primary Decision Maker is:        Discharge Planning:    Patient lives with: Children Type of Home: Skilled Nursing Facility  Primary Care Giver:    Patient Support Systems include: Children, Family Members   Current Financial resources:    Current community resources:    Current services prior to admission: [facility]            Current DME:              Type of Home Care services:  None    ADLS  Prior functional level:    Current functional level:      PT AM-PAC:   /24  OT AM-PAC:   /24    Family can provide assistance at DC: Would you like Case Management to discuss the discharge plan with any other family members/significant others, and if so, who?     Plans to Return to Present Housing:    Other Identified Issues/Barriers to RETURNING to current housing: none  Potential Assistance needed at discharge: [facility], Transitional Care, Transportation            Potential DME:    Patient expects to discharge to: [facility]  for transportation at discharge: Lm for pt to call for lab results.

## 2023-04-13 NOTE — CARE COORDINATION
Pt is from [facility], does not have a bed hold but can return pending precert and bed availability.   David CHAMBERS&JAYLENE JU:[phone] QQY:[phone]  Electronically signed by Jacobo Alvarez RN on 4/13/2023 at 8:51 AM

## 2023-04-13 NOTE — PLAN OF CARE
Problem: Discharge Planning  Goal: Discharge to home or other facility with appropriate resources  Outcome: Progressing yes

## 2023-04-13 NOTE — PLAN OF CARE
Problem: Discharge Planning  Goal: Discharge to home or other facility with appropriate resources  4/13/2023 1648 by Gwendolyn Mistry RN  Outcome: Progressing  4/13/2023 0514 by Mariam Oneill RN  Outcome: Progressing     Problem: Skin/Tissue Integrity  Goal: Absence of new skin breakdown  Description: 1. Monitor for areas of redness and/or skin breakdown  2. Assess vascular access sites hourly  3. Every 4-6 hours minimum:  Change oxygen saturation probe site  4. Every 4-6 hours:  If on nasal continuous positive airway pressure, respiratory therapy assess nares and determine need for appliance change or resting period.   4/13/2023 1648 by Gwendolyn Mistry RN  Outcome: Progressing  4/13/2023 0514 by Mariam Oneill RN  Outcome: Progressing     Problem: Safety - Adult  Goal: Free from fall injury  4/13/2023 1648 by Gwendolyn Mistry RN  Outcome: Progressing  4/13/2023 0514 by Mariam Oneill RN  Outcome: Progressing     Problem: ABCDS Injury Assessment  Goal: Absence of physical injury  4/13/2023 1648 by Gwendolyn Mistry RN  Outcome: Progressing  4/13/2023 0514 by Mariam Oneill RN  Outcome: Progressing     Problem: Pain  Goal: Verbalizes/displays adequate comfort level or baseline comfort level  4/13/2023 1648 by Gwendolyn Mistry RN  Outcome: Progressing  4/13/2023 0514 by Mariam Oneill RN  Outcome: Progressing

## 2023-04-14 NOTE — PLAN OF CARE
Problem: Skin/Tissue Integrity  Goal: Absence of new skin breakdown  Description: 1. Monitor for areas of redness and/or skin breakdown  2. Assess vascular access sites hourly  3. Every 4-6 hours minimum:  Change oxygen saturation probe site  4. Every 4-6 hours:  If on nasal continuous positive airway pressure, respiratory therapy assess nares and determine need for appliance change or resting period.   4/13/2023 2332 by Marilyn Yan RN  Outcome: Progressing     Problem: Discharge Planning  Goal: Discharge to home or other facility with appropriate resources  4/13/2023 2332 by Marilyn Yan RN  Outcome: Progressing  Flowsheets (Taken 4/13/2023 2207)  Discharge to home or other facility with appropriate resources: Identify barriers to discharge with patient and caregiver     Problem: Safety - Adult  Goal: Free from fall injury  4/13/2023 2332 by Marilyn Yan RN  Outcome: Progressing     Problem: ABCDS Injury Assessment  Goal: Absence of physical injury  4/13/2023 2332 by Marilyn Yan RN  Outcome: Progressing     Problem: Pain  Goal: Verbalizes/displays adequate comfort level or baseline comfort level  4/13/2023 2332 by Marilyn Yan RN  Outcome: Progressing

## 2023-04-14 NOTE — PLAN OF CARE
Contacted at this time that Na 157    Have changed LR at 75cc/h to D5 at 75cc/h  Nephro consultation for help with Na management  Na now and Q2h x3  Strict Is and Os  Daily Weights  Elevate HoB 30-45'  Elevate Legs to prevent sliding down in bed  Phos add on  Mag add on  LA

## 2023-04-14 NOTE — CONSULTS
[facility] Neurology Consult      Patient:   Brianna Eason  MR#:    [id number]  Account Number:                   [id number]      Room:    4277/179-03   YOB: 1950  Date of Progress Note: 4/14/2023  Time of Note                           11:50 AM  Attending Physician:  Maicol Phillips MD  Consulting Physician:  Yumiko Hurtado DO       CHIEF COMPLAINT:  AMS     HISTORY OF PRESENT ILLNESS:   This is a 81 y.o. female who was admitted with altered mental status. She has underlying history of dementia, hypertension, hyperlipidemia and anxiety. She was recently admitted here for a nondisplaced right hip fracture that was deemed nonsurgical.  She had waxing and waning agitation during that admission which did respond to Seroquel. She has also been recently admitted at [facility]. She was transferred there from our ER after head CT revealed possible ischemic changes and CTA with high-grade proximal branch MCA stenosis. She was not treated from an interventional standpoint. CT there revealed moderate ventriculomegaly, and chronic ischemic changes. Over the past few days family has noted worsening confusion and agitation. She does have an underlying urinary tract infection and has developed hypernatremia. She is also been treated with pain medication and benzodiazepines. Family denies any focal weakness or facial drooping. History is limited currently from the patient due to her underlying agitation. She has not reported headaches. No significant fever.     REVIEW OF SYSTEMS:  Limited given AMS     PAST MEDICAL HISTORY:      Diagnosis Date    Anemia     Anxiety     Arthritis     Cobalamin deficiency     Dementia ([facility] [address]     Depression     Fatigue     Hyperlipidemia     Hypertension     Insomnia     Palliative care patient 04/13/2023       PAST SURGICAL HISTORY:      Procedure Laterality Date    COLONOSCOPY N/A 5/24/2016    Dr Nathen CRUZ (-) dysplasia x 1, HP x 3, 5 yr recall    KNEE
Nephrology ([facility]) Consult Note      Patient:  Jt Hinkle  YOB: 1950  Date of Service: 4/14/2023  MRN: [id number]   Acct: [de-identified]   Primary Care Physician: Dr. Stuart Couch M.D., MD  Advance Directive: DNR  Admit Date: 4/12/2023       Hospital Day: 2  Referring Provider: Ana Flowers MD    Patient independently seen and examined, Chart, Consults, Notes, Operative notes, Labs, Cardiology, and Radiology studies reviewed as available. Subjective:  Jt Hinkle is a 81 y.o. female for whom we were consulted for evaluation and treatment of hypernatremia. Patient family recalled no prior nephrologic evaluations. Patient unable nucleoside history physical examination due to resuscitation requiring family and sitter to restrain patient. Family noted prior optimal status with UTI treated about a month ago\". She also had a fall with hip fracture recently as well. She was admitted several days ago with a normal serum sodium has increased to 143 then to 148 and 157 the following days. Family also had some diarrhea after getting Zosyn for 7 days ago. He also notes that it has been present for almost a week thus pre-existing to be hypernatremia. Allergies:  Patient has no known allergies.     Medicines:  Current Facility-Administered Medications   Medication Dose Route Frequency Provider Last Rate Last Admin    dextrose 5 % solution   IntraVENous Continuous Ana Flowers MD 75 mL/hr at 04/14/23 0353 New Bag at 04/14/23 0353    lidocaine PF 1 % injection 5 mL  5 mL IntraDERmal Once Ana Flowers MD        sodium chloride flush 0.9 % injection 5-40 mL  5-40 mL IntraVENous 2 times per day Ana Flowers MD        sodium chloride flush 0.9 % injection 5-40 mL  5-40 mL IntraVENous PRN Ana Flowers MD        0.9 % sodium chloride infusion  25 mL IntraVENous PRN Ana Flowers MD        risperiDONE (RISPERDAL M-TABS)
Palliative Care Consult Note    4/13/2023 8:47 AM  Subjective:  Admit Date: 4/12/2023  PCP: Dr. Eulalio Aquino M.D., MD    Date of Service: 4/13/2023    Reason for Consultation:  Goals of Care, Code Status, Family Support     History Obtained From: EMR/Patient and their Family    History Of Present Illness: The patient is a 81 y.o. female with PMH HTN, HLD, dementia, anxiety, and depression who presented to [facility] direct admit 4/12/2023 from [facility] [place] with [address]. Patient fractured her right hip on 4/2/2023 and was not a surgical candidate and was discharged to [address] [place] [address] Way from here 4/7/2023. She was sent to ED for evaluation from [facility] 4/10/2023 due to uncontrolled pain in right hip. CT at that time revealed the right hip showing a mildly increased widening between the greater trochanter and femur without  fluid collection or other problems evident. She was treated with IV pain and anxiety medication and released back to SNF in stable condition from ED. On chart review, appears [facility] 4/11/2023 with uncontrolled pain and agitation/restlessness. Request to transfer here per pt family for continuity of care. On arrival pt was confused, tachycardic, and unable to relay history. She does have dementia baseline. Pts daughter reported Seroquel was stopped then restarted at the nursing facility following initial discharge after hip fracture and she feels patient has not been acting right since. She was initiated on IV abx for UTI at OSH. Workup here revealed Na 147, creatinine 1.4/BUN 36, lactic 2.4, and blood culture obtained. She was admitted under hospitalist services and restarted on seroquel. Palliative care is consulted for goals of care, code status discussion, family support, and symptom management.      Past Medical History:        Diagnosis Date    Anemia     Anxiety     Arthritis     Cobalamin deficiency     Dementia (HCC)     Depression     Fatigue
> 1.4*    < > = values in this interval not displayed. Impression/Plan:  1. Defer to Vascular Surgeon/Specials Suite. Thank you for your time and consult.      Electronically Signed By: Amanda Lawton RN on 4/14/2023 at 1:12 PM

## 2023-04-14 NOTE — PLAN OF CARE
Vascular Surgery      Asked by PICC Nurse to assist with line placement as patient is confused and combative. Family at bedside. Agreeable for Dr. Leno Harp to place PICC line in special procedures laboratory under sedation. S: Patient is alert and will answer some questions with family. O:  BP (!) 154/82   Pulse (!) 102   Temp 97.3 °F (36.3 °C) (Temporal)   Resp 20   Ht 5' 4\" (1.626 m)   Wt 124 lb (56.2 kg) Comment: bedrest  SpO2 93%   BMI 21.28 kg/m²   Chest: Respirations even, unlabored. CV: Normal HT with RRR. No murmur. A:  Hypernatremic  Altered Mental Status, Multifactorial  Dementia    P:  Plan for PICC line placement later this afternoon. Patient may need Precedex if concern for pulling out line access.       Bea Manzo, APRN-BC

## 2023-04-14 NOTE — OP NOTE
Operative Note      Patient: Jaime Benavides  YOB: 1950  MRN: [id number]      4/14/2023    Surgeon:  Osorio Vick DO    Time out:  Prior to the procedure, a time out was performed with all team members present. During the time out, the patient, procedure and procedure site were verbally verified by the team members. Consent:  Was given by family    PICC line indication:  poor peripheral access and hypovolemia     Site:  right basilic vein    PICC type:  Double Lumen PICC    Length:  41 cm    Skin prep: Chloraprep    Local Anesthetic:  Lidocaine 1% without epinephrine    Ultrasound confirmation of site:  Yes    Securement:  with an adhesive dressing    Complications:  None      Preprocedure Diagnosis:  poor peripheral access and hypovolemia     Postprocedure Diagnosis:  Same    Procedure:   1. Ultrasound guided cannulation of right basilic vein  2.  right upper extremity PICC line placement (Bard PowerPicc, dual lumen)    Surgeon:  Osorio Vick DO    Anesthesia:  Lidocaine 1% without epinephrine    Findings:    1. The right basilic vein is patent by ultrasound. 2.  The catheter tip is in right atrium/SVC junction  3. The catheter is ready for use, when needed. Procedure in Detail:    After the patient was consented, she was brought to angiography and placed on the angiography suite table in the supine position. Her right arm was prepped and draped in the usual sterile fashion. Skin and subcutaneous tissues over the vein were anesthetized with lidocaine, and the vein was cannulated under ultrasound guidance with a micropuncture needle. An 0.018 wire was placed through the needle and advanced to the superior vena cava/right atrial junction under fluoroscopic visualization. The needle is removed and replaced with the dilator/tear away sheath. The PICC line was cut to length.   The dilator was removed and the PICC line was placed over the wire to the right atrium/superior vena cava

## 2023-04-15 PROBLEM — Z51.5 ENCOUNTER FOR PALLIATIVE CARE: Status: ACTIVE | Noted: 2023-01-01

## 2023-04-15 NOTE — PLAN OF CARE
Delayed entry    SUBJECTIVE:    Pt is well known to me from admission  Her daughter is a colleague who worked with us on the med-surg floor for years  They are requesting hospice measures for comfort, the HCP is aware that this may hasten passing      OBJECTIVE:    /80   Pulse 51   Temp 97 °F (36.1 °C) (Temporal)   Resp 22   Ht 5' 4\" (1.626 m)   Wt 124 lb (56.2 kg) Comment: bedrest  SpO2 (!) 87%   BMI 21.28 kg/m²       ASSESSMENTS & PLANS:    End Of Life  Increased Secretions  Continue Ketamine GGT, will make titratable  Morphine GGT being added to manage air hunger  Ativan 0.5mg IVP Q2h for Ketamine associated aggression   Luís  Asked RN to stop all labs, stop all VS, stop monitoring, and please update her palliative provide in the AM of her wish to transition to the [facility]

## 2023-04-15 NOTE — DISCHARGE SUMMARY
admission this time pt remains encephalopathic, agitated and restless. She is pulling devices, she is mostly non verbal. She is not tolerating any PO. She was noted with worsening hypernatremia likely in setting of dehydration. She was started on D5 W IVF. I met with her daughter at bedside. She is concerned about pt's declining clinical status and is not sure if she needs to start working with hospice team at this point. She is very clear that this is not a quality of life that her mom would want. We did additional testing and brain imaging CT head yesterday - it is still pending. She was moved to ICU and had PICC line placed and started on multiple gtt medications - namely precedex gtt and ketamine gtt and morphine gtt as prior multiple attempts on PRN medication with ativan, and haldol and seroquel have failed to provide relief of severe unrelenting agitation. She was very sensitive to precedex infusion causing sinus bradycardia and runs of non sustained VTACH    This am she is resting comfortably and HR appears stable sinus bradycardia. She did have sinus pauses early this morning. I discussed her condition with her daughter again today. Her daughter is very clear - she does not feel her mother would want any further testing or intervention. She wants her to be comfortable. She requested hospice evaluation. She was seen by hospice and accepted to [facility] [address] and will transition to IP hospice care today. Physical Exam Performed:     /80   Pulse 88   Temp 97 °F (36.1 °C) (Temporal)   Resp 22   Ht 5' 4\" (1.626 m)   Wt 124 lb (56.2 kg) Comment: bedrest  SpO2 (!) 79%   BMI 21.28 kg/m²       General appearance:  drowsy obtunded on multiple sedating medications. When attempted to wean medications pt severely agitated, restless, non verbal  HEENT:  Normal cephalic, atraumatic without obvious deformity. Pupils equal, round, and reactive to light.   Extra ocular

## 2023-04-15 NOTE — PLAN OF CARE
Plan of care updated to include hospice transition. Dr Clarissa Irby helped with this initiative and pt is now resting comfortably and family is at peace. Pt is resting comfortably. Jaren SEGURA         Problem: Discharge Planning  Goal: Discharge to home or other facility with appropriate resources     Problem: Safety - Medical Restraint  Goal: Remains free of injury from restraints (Restraint for Interference with Medical Device)  Description: INTERVENTIONS:  1. Determine that other, less restrictive measures have been tried or would not be effective before applying the restraint  2. Evaluate the patient's condition at the time of restraint application  3. Inform patient/family regarding the reason for restraint  4. Q2H: Monitor safety, psychosocial status, comfort, nutrition and hydration     Problem: Dyspnea Due to End of Life  Goal: Demonstrate understanding of and ability to manage respiratory symptoms at end of life  Description: Patient  and or family/caregiver will verbalize recall of breathing strategies to maintain an effective breathing pattern during the inpatient hospice stay.         Outcome: Progressing

## 2023-04-17 LAB — BACTERIA BLD CULT ORG #2: NORMAL

## 2023-05-04 NOTE — PROGRESS NOTES
[id number] PATIENT AGGITATED/COMBATIVE UNABLE TO OBTAIN ABGS
4 Eyes Skin Assessment    Quentin Henry is being assessed upon: Admission    I agree that I, Doe Plaza, RN, along with Srinath Colindres RN (either 2 RN's or 1 LPN and 1 RN) have performed a thorough Head to Toe Skin Assessment on the patient. ALL assessment sites listed below have been assessed. Areas assessed by both nurses:     [x]   Head, Face, and Ears   [x]   Shoulders, Back, and Chest  [x]   Arms, Elbows, and Hands   [x]   Coccyx, Sacrum, and Ischium  [x]   Legs, Feet, and Heels    Does the Patient have Skin Breakdown? Yes, wound(s) noted upon assessment. It is the responsibility of the Primary Nurse to assure that the following documentation, preventions, orders, and consults are complete on the above noted wound(s): Wound LDA initiated. LDA Flowsheet Documentation includes the Britt-wound, Wound Assessment, Measurements, Dressing Treatment, Drainage, and Color.     Miquel Prevention initiated: Yes  Wound Care Orders initiated: Yes    [address]  nurse consulted for Pressure Injury (Stage 3,4, Unstageable, DTI, NWPT, and Complex wounds) and New or Established Ostomies: No        Primary Nurse eSignature: Doe Plaza RN on 4/12/2023 at 5:45 PM      Co-Signer eSignature: Electronically signed by Annette Dukes RN on 4/12/23 at 6:17 PM CDT
After am labs this morning; Devi Harrell, her daughter, voices confusion in why I am hanging potassium. While speaking with Devi Harrell, she states that it was communicated to her today that there was no further workup and the plan is to send her to hospice care. Devi Harrell states that at 1800 today she had asked for the palliative care NP Parag Cadena to set up the hospice- but it appears that Hospice has not been consulted for transfer/intake  to official hospice care. Devi Harrell and her family would like for her mother to be officially admitted to the hospice care wing here in the hospital [facility]. Devi Harrell is understandably upset. I am in the process of contacting the physician on call to expedite hospice intake asap. Explained to Devi Harrell, with charge IMELDA Blackwell that we will contact Dr Kristofer Lubin to see what we can do to facilitate the appropriate plan of care per the family's wishes. Dr Kristofer Lubin is putting in new orders.        True Rodrick
Dr Marta Leon has entered new orders to help with the transition to comfort care, hung morphine gtt and gave ativan per order. Pt is much more sedate now. Secretions clearing, no more gurgling in oropharynx. Sat and spoke with Junius Post for some time, provided emotional support and comfort. They ask we do no more blood work, blood pressures, etc.  Hospice level care only per request of family. Communicated to charge IMELDA Avila.       Laquetta Fothergill MSN RN
Holger Wilkinson arrived to room # 35 20 43. Presented with: Altered Mental Status, UTI, and Tachycardia  Mental Status: Patient is disoriented and confused. Vitals:    04/12/23 1636   BP: (!) 157/88   Pulse: (!) 114   Temp: 99 °F (37.2 °C)   SpO2: 92%     Patient safety contract and falls prevention contract reviewed with patient Yes. Oriented Patient and Family to room. Call light within reach. Yes. Needs, issues or concerns expressed at this time: yes.       Electronically signed by Kevin Miller RN on 4/12/2023 at 5:49 PM
Ketamine appears to be taking affect, family has taken off the b/l wrist restraints. If pt becomes agitated, will need to obtain new order for restraints. Pt restraints removed at 2000 4/14 per family and will remain off unless necessary. Pt is resting comfortably, appears to be sleeping. Comfort care in effect at this time. Family at bedside, providing emotional support and ongoing clinical monitoring.       Suzan Yang MSN RN
Morning medications, oral care, turning, blood pressure check, and assessment held per family request to keep patient comfortable during transition.     Electronically signed by Bronwyn Garcia RN on 4/15/2023 at 9:14 AM
Notified by night shift nurse patient daughter requesting to transition to hospice care. Wishes confirmed with patient's daughter, Le Kovacs, at bedside. Dr. Anaya Apo updated for orders. Hospice consult called and awaiting call back at this time. Drips titrated and medications given for comfort.     Electronically signed by Abel Aguilar RN on 4/15/2023 at 8:00 AM
Occupational Therapy  Note patient transferred to ICU. Will continue to follow.   Electronically signed by Jacqueline Cool OT on 4/14/2023 at 1:22 PM
Occupational Therapy  Patient just received medications and resting quietly/sleeping upon arrival to patient's room. Deferring evaluation until tomorrow.   Will continue to follow Electronically signed by Susannah Patterson OT on 4/13/2023 at 4:21 PM
Patient attempted for MRI, while on Precedex pt continued to be combative and dangerous to [facility]. MRI cancelled. Upon bringing patient back to ICU, she began to throw a lot of PVC's and a 12 beat run of [address]. Precedex was shut off and MD was called to bedside. MD spoke with family regarding next steps. Family stated that they wish to just keep her comfortable. Per MD, patient is going to be continued on Precedex and orders were given to start Ketamine as well for better pain management and comfort measures. Family is currently at bedside.
Patient transferred to ICU. Patient is now on Precedex and is calm. Called Specials as it would now be safe to attempt PICC at bedside, however they informed me the patient will remain on schedule. Primary RN aware.
Patient very agitated in bed, unable to follow commands or be re oriented. RN gave patient prn medication to help agitation, patient still restless and agitated in bed. Sitter and family at bedside attempting to re direct patient.       Electronically signed by Noemy Nava RN on 4/14/2023 at 4:16 PM
Physical Therapy  Name: Jany Balderas  MRN:  [id number]  Date of service:  4/15/2023  Will defer PT consult. Per notes, family wishes for pt to go to Hospice.    Electronically signed by Emily Suh PT on 4/15/2023 at 7:38 AM
Physician Progress Note      PATIENT:               Loren Hernandez  CSN #:                  [id number]  :                       1950  ADMIT DATE:       2023 4:14 PM  Linda Pham [place] DATE:        4/15/2023 11:13 AM  RESPONDING  PROVIDER #:        Fernando Roberts MD          QUERY TEXT:    Pt admitted with UTI and has encephalopathy documented. If possible, please   document in progress notes and discharge summary further specificity regarding   the type of encephalopathy:    The medical record reflects the following:  Risk Factors: UTI, hypernatremia, renal insufficiency  Clinical Indicators: presented with AMS and increased agitation and   restlessness; pnotes  stated \"recurrent ongoing and persisting severe   encephalopathy\" and in same note 'her UA is indeed abnormal but hardly   convincing that UTI is to blame for her drastic mental status changes\"; CT   head-no evidence of acute intracranial hemorrhage or territorial infarction  Treatment: head CT, seroquel, neuro consult, sitter at bedside, transition to   hospice care    Thank you,  Geovany Padilla, CCDS  [phone]  Options provided:  -- Metabolic encephalopathy  -- Encephalopathy due to, Please specify. -- Other - I will add my own diagnosis  -- Disagree - Not applicable / Not valid  -- Disagree - Clinically unable to determine / Unknown  -- Refer to Clinical Documentation Reviewer    PROVIDER RESPONSE TEXT:    This patient has metabolic encephalopathy.     Query created by: Aditya Luis on 2023 9:02 AM      Electronically signed by:  Fernando Roberts MD 2023 6:10 PM
Pt continuously pulling off cardiac monitor leads. Pt daughter Ken Obrien requested to keep monitor leads off. Daughter Ken Obrien and sitter at bedside with patient all night.      Electronically signed by Asmita Castro RN on 4/13/2023 at 5:01 AM
Pt has been agitated,pulling Iv's whole night. She has not slept at all. Pain med has been given every 2 hours per family request. Will continue to monitor.
Pt very combative. Unable to do neuro assessment or listen to lungs. Pt has to be held to even hook up iv. Very restless all over the bed, pt has sitter and family at bedside. kimberlyn elena
Report called to [facility] RN in hospice care center. Patient transported with transportation assistance. Family at bedside.     Electronically signed by Yahaira Sierra RN on 4/15/2023 at 11:13 AM
The pts dtr signed the adm forms admitting the pt to the [facility] [place] [address] [place] [address]. services explained. Folder and Booklet provided. DNR discussed and signed. After neg COVID test, It is ok to dc the pt, call [phone] to give report then transfer to tp to the [place] [address].. Emotional and SC support provided.
Unable to do ct scan and abg's, pt very combative and restless, sitter at bedside. Family present. Unable to do picc, pt will not hold still. Picc rn Kun talked to specials and radiology they are unable to sedation today.  Kun is notifying md for next step.tgreeno rn
systems is negative except as specifically addressed above. Objective:   VITALS:  BP (!) 151/90   Pulse (!) 109   Temp 97.9 °F (36.6 °C) (Temporal)   Resp 20   Ht 5' 4\" (1.626 m)   Wt 124 lb (56.2 kg) Comment: bedrest  SpO2 93%   BMI 21.28 kg/m²   24HR INTAKE/OUTPUT:    Intake/Output Summary (Last 24 hours) at 4/13/2023 1710  Last data filed at 4/13/2023 1408  Gross per 24 hour   Intake 0 ml   Output 250 ml   Net -250 ml       Physical Exam  Vitals and nursing note reviewed. Constitutional:       Appearance: Normal appearance. She is ill-appearing. HENT:      Head: Normocephalic. Nose: Nose normal.      Mouth/Throat:      Mouth: Mucous membranes are dry. Cardiovascular:      Rate and Rhythm: Normal rate and regular rhythm. Pulses: Normal pulses. Heart sounds: Normal heart sounds. Pulmonary:      Effort: Pulmonary effort is normal.      Breath sounds: Normal breath sounds. Abdominal:      General: Bowel sounds are normal. There is no distension. Palpations: Abdomen is soft. Tenderness: There is no abdominal tenderness. Musculoskeletal:      Cervical back: Normal range of motion. Skin:     General: Skin is warm and dry. Neurological:      Mental Status: She is alert. She is disoriented.            Medications:      sodium chloride      lactated ringers IV soln 75 mL/hr at 04/12/23 1824      haloperidol lactate  5 mg IntraMUSCular q8h    aspirin  81 mg Oral Daily    oxyCODONE-acetaminophen  1 tablet Oral Q8H    QUEtiapine  25 mg Oral BID    sodium chloride flush  5-40 mL IntraVENous 2 times per day    cefTRIAXone (ROCEPHIN) IV  1,000 mg IntraVENous Q24H    apixaban  2.5 mg Oral BID    miconazole  1 applicator Vaginal Nightly    miconazole   Topical BID    Vitamin D  2,000 Units Oral Daily     fentanNYL, fentanNYL, sodium chloride flush, sodium chloride, potassium chloride, magnesium sulfate, [DISCONTINUED] promethazine **OR** ondansetron, polyethylene glycol,
been very challenging. Possible UTI  Her UA is indeed abnormal but hardly convincing that UTI is to blame for her drastic mental status changes. She is on rocephin pending urine culture. DVT Prophylaxis: eliquis - on hold today pending evaluation as above. Diet: ADULT DIET; Regular; Low Fat/Low Chol/High Fiber/2 gm Na  ADULT ORAL NUTRITION SUPPLEMENT; Breakfast, Lunch, Dinner; Other Oral Supplement; Ensure  Code Status: DNR      Dispo - ICU, precedex gtt. Krishna Villafana MD           ADDENDUM:    - pt arrived to ICU, started on precedex gtt with favorable response initially to severe agitation. - still unable to tolerate MRI examination.    - while on precedex gtt pt started to have frequent ectopy, then runs of non sustained self limited Vtach intermittent with sinus bradycardia. - precedex dose rate decreased, but at lower dose pt's severe agitation returning promptly within minutes. - ordered ketamine infusion and will limit precedex not to exceed 0.2 to 0.4 dose in hope that this combination will be better tolerated by her HR. - will load with phenytoin today. Intermittent nature of her confusion and agitation persisting this admission can potentially indicate status epilepticus. EEG will not be done till tomorrow. Went back to discuss with family at bedside. Multiple family members at bedside. Daughter is very clear - they do not wish for escalation of care or artificial life support. Prognosis is guarded. I am concerned about terminal delirium.          Krishna Villafana MD  4/14/2023  6:54 PM
continuing work up for now though I do feel patient would be appropriate for inpatient hospice once reversible causes of AMS have been worked up and treated appropriately if she remains agitated and unable to tolerate PO. D/w RN as well. Recommendations:   Palliative Care-GOC Address restlessness and pain. Continue work up/treatment if indicated for abrupt worsening of mental status Patient's daughter, Catheline Carrel, is interested in inpatient hospice pending Neurology/Nephrology work up/treatment recommendations if patient remains in her current state despite reversible causing being addressed   Code status- DNR  AMS- suspect acute encephalopathy multifactorial 2/2 above. Baseline dementia. PT/OT/ST as able. CT head results at OSH reviewed in soft chart and negative for acute abnormalities. Symptom management and supportive care. Bedside sitter continued. Neurology consulted   Agitation/restlessness- Will stop seroquel at this time as she cannot swallow it and add dissolvable Risperdal. D/w RN/Sitter   Per documentation 04/13/2023-\"med changes made per primary team 4/13/23. Initiated haldol 5 mg IM scheduled BID with Seroquel 25 mg BID however pt unable to take PO today, monitor for symptom improvement. Would minimize use of vistaril to avoid anticholingeric effects. Could consider use of risperdal solution which can be administered sublingually if pt unable to swallow meds and may be less sedating. \"  Recent mechanical fall with right hip fx- I recommend Dilaudid 1 mg q 3 prn pain though will defer to Hospitalist/Neurology pending Neuro work up. Patient had little response w/ Fentanyl and is not tolerating PO at this time   Per note 04/13/2023 \"evaluated by ortho last admission for right greater trochanter fracture without intertrochanteric extension and recommended non operative treatment with partial weightbearing as tolerated.  Pain control ordered per hospitalist with PO percocet Q8h as pt able and IVP fentanyl
